# Patient Record
Sex: MALE | Race: WHITE | NOT HISPANIC OR LATINO | URBAN - METROPOLITAN AREA
[De-identification: names, ages, dates, MRNs, and addresses within clinical notes are randomized per-mention and may not be internally consistent; named-entity substitution may affect disease eponyms.]

---

## 2019-03-14 PROBLEM — Z00.00 ENCOUNTER FOR PREVENTIVE HEALTH EXAMINATION: Status: ACTIVE | Noted: 2019-03-14

## 2019-03-18 PROBLEM — F17.210 DEPENDENCE ON NICOTINE FROM CIGARETTES: Status: ACTIVE | Noted: 2019-03-18

## 2019-03-18 PROBLEM — Z87.09 HISTORY OF PULMONARY EMPHYSEMA: Status: RESOLVED | Noted: 2019-03-18 | Resolved: 2019-03-18

## 2019-03-18 PROBLEM — R91.1 LUNG NODULE: Status: ACTIVE | Noted: 2019-03-18

## 2019-03-18 PROBLEM — Z86.39 HISTORY OF HIGH CHOLESTEROL: Status: RESOLVED | Noted: 2019-03-18 | Resolved: 2019-03-18

## 2019-03-18 PROBLEM — F17.200 CURRENT EVERY DAY SMOKER: Status: ACTIVE | Noted: 2019-03-18

## 2019-03-18 RX ORDER — ASPIRIN ENTERIC COATED TABLETS 81 MG 81 MG/1
81 TABLET, DELAYED RELEASE ORAL
Refills: 0 | Status: ACTIVE | COMMUNITY

## 2019-03-18 RX ORDER — ATORVASTATIN CALCIUM 80 MG/1
TABLET, FILM COATED ORAL
Refills: 0 | Status: ACTIVE | COMMUNITY

## 2019-03-22 ENCOUNTER — INPATIENT (INPATIENT)
Facility: HOSPITAL | Age: 64
LOS: 9 days | Discharge: HOME | End: 2019-04-01
Attending: FAMILY MEDICINE | Admitting: FAMILY MEDICINE
Payer: COMMERCIAL

## 2019-03-22 VITALS
HEIGHT: 66 IN | OXYGEN SATURATION: 100 % | TEMPERATURE: 99 F | RESPIRATION RATE: 18 BRPM | HEART RATE: 115 BPM | WEIGHT: 145.06 LBS | DIASTOLIC BLOOD PRESSURE: 100 MMHG | SYSTOLIC BLOOD PRESSURE: 164 MMHG

## 2019-03-22 LAB
ABO RH CONFIRMATION: SIGNIFICANT CHANGE UP
ALBUMIN SERPL ELPH-MCNC: 3.1 G/DL — LOW (ref 3.3–5)
ALP SERPL-CCNC: 78 U/L — SIGNIFICANT CHANGE UP (ref 40–120)
ALT FLD-CCNC: 28 U/L — SIGNIFICANT CHANGE UP (ref 12–78)
ANION GAP SERPL CALC-SCNC: 8 MMOL/L — SIGNIFICANT CHANGE UP (ref 5–17)
APTT BLD: 32.8 SEC — SIGNIFICANT CHANGE UP (ref 27.5–36.3)
AST SERPL-CCNC: 30 U/L — SIGNIFICANT CHANGE UP (ref 15–37)
BASOPHILS # BLD AUTO: 0.04 K/UL — SIGNIFICANT CHANGE UP (ref 0–0.2)
BASOPHILS NFR BLD AUTO: 0.5 % — SIGNIFICANT CHANGE UP (ref 0–2)
BILIRUB SERPL-MCNC: 0.3 MG/DL — SIGNIFICANT CHANGE UP (ref 0.2–1.2)
BLD GP AB SCN SERPL QL: SIGNIFICANT CHANGE UP
BUN SERPL-MCNC: 22 MG/DL — SIGNIFICANT CHANGE UP (ref 7–23)
CALCIUM SERPL-MCNC: 8.4 MG/DL — LOW (ref 8.5–10.1)
CHLORIDE SERPL-SCNC: 104 MMOL/L — SIGNIFICANT CHANGE UP (ref 96–108)
CO2 SERPL-SCNC: 26 MMOL/L — SIGNIFICANT CHANGE UP (ref 22–31)
CREAT SERPL-MCNC: 0.91 MG/DL — SIGNIFICANT CHANGE UP (ref 0.5–1.3)
EOSINOPHIL # BLD AUTO: 0.09 K/UL — SIGNIFICANT CHANGE UP (ref 0–0.5)
EOSINOPHIL NFR BLD AUTO: 1 % — SIGNIFICANT CHANGE UP (ref 0–6)
GLUCOSE SERPL-MCNC: 111 MG/DL — HIGH (ref 70–99)
HCT VFR BLD CALC: 36.1 % — LOW (ref 39–50)
HCT VFR BLD CALC: 36.6 % — LOW (ref 39–50)
HGB BLD-MCNC: 12.2 G/DL — LOW (ref 13–17)
HGB BLD-MCNC: 12.5 G/DL — LOW (ref 13–17)
IMM GRANULOCYTES NFR BLD AUTO: 0.2 % — SIGNIFICANT CHANGE UP (ref 0–1.5)
INR BLD: 1.17 RATIO — HIGH (ref 0.88–1.16)
LYMPHOCYTES # BLD AUTO: 1.25 K/UL — SIGNIFICANT CHANGE UP (ref 1–3.3)
LYMPHOCYTES # BLD AUTO: 14.1 % — SIGNIFICANT CHANGE UP (ref 13–44)
MCHC RBC-ENTMCNC: 30.4 PG — SIGNIFICANT CHANGE UP (ref 27–34)
MCHC RBC-ENTMCNC: 34.2 GM/DL — SIGNIFICANT CHANGE UP (ref 32–36)
MCV RBC AUTO: 89.1 FL — SIGNIFICANT CHANGE UP (ref 80–100)
MONOCYTES # BLD AUTO: 1.13 K/UL — HIGH (ref 0–0.9)
MONOCYTES NFR BLD AUTO: 12.8 % — SIGNIFICANT CHANGE UP (ref 2–14)
NEUTROPHILS # BLD AUTO: 6.31 K/UL — SIGNIFICANT CHANGE UP (ref 1.8–7.4)
NEUTROPHILS NFR BLD AUTO: 71.4 % — SIGNIFICANT CHANGE UP (ref 43–77)
NRBC # BLD: 0 /100 WBCS — SIGNIFICANT CHANGE UP (ref 0–0)
PLATELET # BLD AUTO: 281 K/UL — SIGNIFICANT CHANGE UP (ref 150–400)
POTASSIUM SERPL-MCNC: 4.1 MMOL/L — SIGNIFICANT CHANGE UP (ref 3.5–5.3)
POTASSIUM SERPL-SCNC: 4.1 MMOL/L — SIGNIFICANT CHANGE UP (ref 3.5–5.3)
PROT SERPL-MCNC: 7 GM/DL — SIGNIFICANT CHANGE UP (ref 6–8.3)
PROTHROM AB SERPL-ACNC: 13.1 SEC — HIGH (ref 10–12.9)
RBC # BLD: 4.11 M/UL — LOW (ref 4.2–5.8)
RBC # FLD: 13.2 % — SIGNIFICANT CHANGE UP (ref 10.3–14.5)
SODIUM SERPL-SCNC: 138 MMOL/L — SIGNIFICANT CHANGE UP (ref 135–145)
TYPE + AB SCN PNL BLD: SIGNIFICANT CHANGE UP
WBC # BLD: 8.84 K/UL — SIGNIFICANT CHANGE UP (ref 3.8–10.5)
WBC # FLD AUTO: 8.84 K/UL — SIGNIFICANT CHANGE UP (ref 3.8–10.5)

## 2019-03-22 PROCEDURE — 99285 EMERGENCY DEPT VISIT HI MDM: CPT

## 2019-03-22 PROCEDURE — 71275 CT ANGIOGRAPHY CHEST: CPT | Mod: 26

## 2019-03-22 PROCEDURE — 93010 ELECTROCARDIOGRAM REPORT: CPT

## 2019-03-22 RX ORDER — ATORVASTATIN CALCIUM 80 MG/1
40 TABLET, FILM COATED ORAL AT BEDTIME
Qty: 0 | Refills: 0 | Status: DISCONTINUED | OUTPATIENT
Start: 2019-03-22 | End: 2019-04-01

## 2019-03-22 RX ORDER — ACETAMINOPHEN 500 MG
650 TABLET ORAL EVERY 6 HOURS
Qty: 0 | Refills: 0 | Status: DISCONTINUED | OUTPATIENT
Start: 2019-03-22 | End: 2019-04-01

## 2019-03-22 RX ADMIN — ATORVASTATIN CALCIUM 40 MILLIGRAM(S): 80 TABLET, FILM COATED ORAL at 23:05

## 2019-03-22 NOTE — H&P ADULT - ASSESSMENT
63 yo male presented with coughing up blood for 2 days.    A/P:    1.  Hemoptysis  Weight Loss  Shortness of breath  -will follow Hb/Hct  -follow Pulmonary and Cardiothoracic Surgery consult  -hold aspirin    2.  HLD  -on statin    3.  SCD for DVT ppx    4.  Code status: Full code. 63 yo male presented with coughing up blood for 2 days.    A/P:    1.  Hemoptysis  Weight Loss  Shortness of breath  Right Lung mass  -will follow Hb/Hct  -follow Pulmonary and Cardiothoracic Surgery consult  -hold aspirin    2.  HLD  -on statin    3.  SCD for DVT ppx    4.  Code status: Full code.

## 2019-03-22 NOTE — ED ADULT NURSE NOTE - OBJECTIVE STATEMENT
Pt presents to ED c/o sudden onset of hemoptysis last night and today. Recently diagnosed with a lung mass after imaging by pulmonologist. Around 19:00-20:00 last night, pt reports sudden onset of hemoptysis described as "coughing up red blood." After symptoms resolved, pt felt it unnecessary to seek evaluation. At 14:00 today pt reports return of symptoms. Presents with mother and brother at bedside. No h/o similar symptoms. Does not take blood thinners.  Denies chest pain, SOB

## 2019-03-22 NOTE — H&P ADULT - RS GEN PE MLT RESP DETAILS PC
airway patent/diminished breath sounds, R/respirations non-labored/Coarse breath sound in the right middle lobe area.

## 2019-03-22 NOTE — ED PROVIDER NOTE - PROGRESS NOTE DETAILS
Tino GROVER for ED attending Dr. Rico: Spoke to Dr. Ajay boo for Dr. Scott. Requests pt be seen by Dr. Roldan and she will see the pt.

## 2019-03-22 NOTE — ED PROVIDER NOTE - OBJECTIVE STATEMENT
65 y/o male with a PMHx of HLD on Lipitor, lung mass presents to the ED c/o sudden onset of hemoptysis last night and today. Recently diagnosed with a lung mass after imaging by pulmonologist. Around 19:00-20:00 last night, pt reports sudden onset of hemoptysis described as "coughing up red blood." After symptoms resolved, pt felt it unnecessary to seek evaluation. At 14:00 today pt reports return of symptoms. Presents with mother and brother at bedside. No h/o similar symptoms. Does not take blood thinners. Denies fever, SOB. Smoker. PMD: Julienne Mao. Cardiologist: Kaitlyn Roldan. 63 y/o male with a PMHx of HLD on Lipitor, lung mass presents to the ED c/o sudden onset of hemoptysis last night and today. Recently diagnosed with a lung mass after imaging by pulmonologist. Around 19:00-20:00 last night, pt reports sudden onset of hemoptysis described as "coughing up red blood." After symptoms resolved, pt felt it unnecessary to seek evaluation. At 14:00 today pt reports return of symptoms. Presents with mother and brother at bedside. No h/o similar symptoms. Does not take blood thinners. Denies fever, SOB. Smoker. PMD: Julienne Church. Cardiologist: Kaitlyn Roldan.

## 2019-03-22 NOTE — CONSULT NOTE ADULT - ASSESSMENT
- hemoptysis likely secondary to lung cancer which is likely occluding the right upper lobe bronchus and narrowing of the bronchus intermedius.  - severe copd with symptoms of exacerbation with the bilateral wheeze    - active smoker with the continued smoking and refused to do full pft in the office visit   - H.L     PLAN     - admit to the monitored  unit for close observation   - bleeding likely from the right lung with the tumor .  - steroids for the copd exacerbation   - add nebs and hold ASA   - consult thoracic surgery for emergency operative intervention if needed   - I.R can also consulted  if needed for the embolization   - given the emphysema patient may not surgical candidate would consider radiation and chemotherapy . consult ICU in event massive hemoptysis .  - patient condition was discussed with the mother and brother at bed side

## 2019-03-22 NOTE — H&P ADULT - NEUROLOGICAL DETAILS
responds to pain/normal strength/responds to verbal commands/sensation intact/deep reflexes intact/alert and oriented x 3/cranial nerves intact

## 2019-03-22 NOTE — H&P ADULT - NSHPPHYSICALEXAM_GEN_ALL_CORE
Vital Signs Last 24 Hrs  T(C): 37.2 (22 Mar 2019 18:52), Max: 37.3 (22 Mar 2019 14:26)  T(F): 99 (22 Mar 2019 18:52), Max: 99.1 (22 Mar 2019 14:26)  HR: 95 (22 Mar 2019 18:52) (95 - 100)  BP: 109/74 (22 Mar 2019 18:52) (106/76 - 110/68)  RR: 18 (22 Mar 2019 18:52) (18 - 18)  SpO2: 100% (22 Mar 2019 18:52) (94% - 100%)

## 2019-03-22 NOTE — ED ADULT NURSE REASSESSMENT NOTE - NS ED NURSE REASSESS COMMENT FT1
Pt changing out of dirty clothes and into clean gown and pt socks. Pt updated on plan of care. Normal breathing pattern with no difficulty

## 2019-03-23 DIAGNOSIS — J98.01 ACUTE BRONCHOSPASM: ICD-10-CM

## 2019-03-23 DIAGNOSIS — J44.1 CHRONIC OBSTRUCTIVE PULMONARY DISEASE WITH (ACUTE) EXACERBATION: ICD-10-CM

## 2019-03-23 DIAGNOSIS — R91.8 OTHER NONSPECIFIC ABNORMAL FINDING OF LUNG FIELD: ICD-10-CM

## 2019-03-23 DIAGNOSIS — R04.2 HEMOPTYSIS: ICD-10-CM

## 2019-03-23 LAB
BASOPHILS # BLD AUTO: 0.05 K/UL — SIGNIFICANT CHANGE UP (ref 0–0.2)
BASOPHILS NFR BLD AUTO: 0.6 % — SIGNIFICANT CHANGE UP (ref 0–2)
EOSINOPHIL # BLD AUTO: 0.13 K/UL — SIGNIFICANT CHANGE UP (ref 0–0.5)
EOSINOPHIL NFR BLD AUTO: 1.5 % — SIGNIFICANT CHANGE UP (ref 0–6)
HCT VFR BLD CALC: 36.7 % — LOW (ref 39–50)
HCV AB S/CO SERPL IA: 0.16 S/CO — SIGNIFICANT CHANGE UP (ref 0–0.79)
HCV AB SERPL-IMP: SIGNIFICANT CHANGE UP
HGB BLD-MCNC: 12.3 G/DL — LOW (ref 13–17)
IMM GRANULOCYTES NFR BLD AUTO: 0.1 % — SIGNIFICANT CHANGE UP (ref 0–1.5)
LYMPHOCYTES # BLD AUTO: 1.36 K/UL — SIGNIFICANT CHANGE UP (ref 1–3.3)
LYMPHOCYTES # BLD AUTO: 16 % — SIGNIFICANT CHANGE UP (ref 13–44)
MCHC RBC-ENTMCNC: 29.8 PG — SIGNIFICANT CHANGE UP (ref 27–34)
MCHC RBC-ENTMCNC: 33.5 GM/DL — SIGNIFICANT CHANGE UP (ref 32–36)
MCV RBC AUTO: 88.9 FL — SIGNIFICANT CHANGE UP (ref 80–100)
MONOCYTES # BLD AUTO: 1.08 K/UL — HIGH (ref 0–0.9)
MONOCYTES NFR BLD AUTO: 12.7 % — SIGNIFICANT CHANGE UP (ref 2–14)
NEUTROPHILS # BLD AUTO: 5.89 K/UL — SIGNIFICANT CHANGE UP (ref 1.8–7.4)
NEUTROPHILS NFR BLD AUTO: 69.1 % — SIGNIFICANT CHANGE UP (ref 43–77)
NRBC # BLD: 0 /100 WBCS — SIGNIFICANT CHANGE UP (ref 0–0)
PLATELET # BLD AUTO: 268 K/UL — SIGNIFICANT CHANGE UP (ref 150–400)
RBC # BLD: 4.13 M/UL — LOW (ref 4.2–5.8)
RBC # FLD: 13.3 % — SIGNIFICANT CHANGE UP (ref 10.3–14.5)
WBC # BLD: 8.52 K/UL — SIGNIFICANT CHANGE UP (ref 3.8–10.5)
WBC # FLD AUTO: 8.52 K/UL — SIGNIFICANT CHANGE UP (ref 3.8–10.5)

## 2019-03-23 PROCEDURE — 99255 IP/OBS CONSLTJ NEW/EST HI 80: CPT

## 2019-03-23 RX ORDER — BUDESONIDE, MICRONIZED 100 %
0.5 POWDER (GRAM) MISCELLANEOUS
Qty: 0 | Refills: 0 | Status: DISCONTINUED | OUTPATIENT
Start: 2019-03-23 | End: 2019-04-01

## 2019-03-23 RX ORDER — IPRATROPIUM/ALBUTEROL SULFATE 18-103MCG
3 AEROSOL WITH ADAPTER (GRAM) INHALATION EVERY 6 HOURS
Qty: 0 | Refills: 0 | Status: DISCONTINUED | OUTPATIENT
Start: 2019-03-23 | End: 2019-04-01

## 2019-03-23 RX ADMIN — Medication 0.5 MILLIGRAM(S): at 20:34

## 2019-03-23 RX ADMIN — ATORVASTATIN CALCIUM 40 MILLIGRAM(S): 80 TABLET, FILM COATED ORAL at 21:42

## 2019-03-23 RX ADMIN — Medication 40 MILLIGRAM(S): at 21:42

## 2019-03-23 RX ADMIN — Medication 40 MILLIGRAM(S): at 12:21

## 2019-03-23 RX ADMIN — Medication 3 MILLILITER(S): at 13:56

## 2019-03-23 RX ADMIN — Medication 3 MILLILITER(S): at 20:33

## 2019-03-23 NOTE — PROGRESS NOTE ADULT - ASSESSMENT
- no hemoptysis since last nite  - CT scan reviewed. Has 4cm cavitary lesion encasing RUL bronchus w hilar node- appears to have endobronchial involvement  - plan for bronchoscopy  - clinical Stage IIIA  - will start albuterol/budesonide/iv steroids  - dvt proph

## 2019-03-23 NOTE — DIETITIAN INITIAL EVALUATION ADULT. - PERTINENT LABORATORY DATA
03-22 Na138 mmol/L Glu 111 mg/dL<H> K+ 4.1 mmol/L Cr  0.91 mg/dL BUN 22 mg/dL Phos n/a   Alb 3.1 g/dL<L> PAB n/a

## 2019-03-23 NOTE — PROGRESS NOTE ADULT - SUBJECTIVE AND OBJECTIVE BOX
Subjective:  no hemoptysis today    MEDICATIONS  (STANDING):  atorvastatin 40 milliGRAM(s) Oral at bedtime    MEDICATIONS  (PRN):  acetaminophen   Tablet .. 650 milliGRAM(s) Oral every 6 hours PRN Temp greater or equal to 38C (100.4F), Mild Pain (1 - 3)      Allergies    No Known Allergies    Intolerances        REVIEW OF SYSTEMS:    CONSTITUTIONAL:  As per HPI.  HEENT:  Eyes:  No diplopia or blurred vision. ENT:  No earache, sore throat or runny nose.  CARDIOVASCULAR:  No pressure, squeezing, tightness, heaviness or aching about the chest, neck, axilla or epigastrium.  RESPIRATORY:  No cough, shortness of breath, PND or orthopnea.  GASTROINTESTINAL:  No nausea, vomiting or diarrhea.  GENITOURINARY:  No dysuria, frequency or urgency.  MUSCULOSKELETAL:  no joint pain, deformity, tenderness  EXTREMITIES: no clubbing cyanosis,edema  SKIN:  No change in skin, hair or nails.  NEUROLOGIC:  No paresthesias, fasciculations, seizures or weakness.  PSYCHIATRIC:  No disorder of thought or mood.  ENDOCRINE:  No heat or cold intolerance, polyuria or polydipsia.  HEMATOLOGICAL:  No easy bruising or bleedings:    Vital Signs Last 24 Hrs  T(C): 36.9 (23 Mar 2019 08:55), Max: 37.3 (22 Mar 2019 14:26)  T(F): 98.5 (23 Mar 2019 08:55), Max: 99.2 (22 Mar 2019 23:00)  HR: 79 (23 Mar 2019 08:00) (79 - 100)  BP: 108/61 (23 Mar 2019 08:00) (102/58 - 110/75)  BP(mean): 73 (23 Mar 2019 08:00) (68 - 78)  RR: 19 (23 Mar 2019 08:00) (16 - 21)  SpO2: 97% (23 Mar 2019 08:00) (91% - 100%)    PHYSICAL EXAMINATION:  SKIN: no rashes  HEAD: NC/AT  EYES: PERRLA, EOMI  EARS: TM's intact  NOSE: no abnormalities  NECK:  Supple. No lymphadenopathy. Jugular venous pressure not elevated. Carotids equal.   HEART:   The cardiac impulse has a normal quality. Reg., Nl S1 and S2.  There are no murmurs, rubs or gallops noted  CHEST: bilateral expiratory ronchi  ABDOMEN:  Soft and nontender.   EXTREMITIES:  no C/C/E  NEURO: AAO x 3, no focal deficts       LABS:                        12.3   8.52  )-----------( 268      ( 23 Mar 2019 06:10 )             36.7     03-22    138  |  104  |  22  ----------------------------<  111<H>  4.1   |  26  |  0.91    Ca    8.4<L>      22 Mar 2019 16:21    TPro  7.0  /  Alb  3.1<L>  /  TBili  0.3  /  DBili  x   /  AST  30  /  ALT  28  /  AlkPhos  78  03-22    PT/INR - ( 22 Mar 2019 16:21 )   PT: 13.1 sec;   INR: 1.17 ratio         PTT - ( 22 Mar 2019 16:21 )  PTT:32.8 sec      RADIOLOGY & ADDITIONAL TESTS:

## 2019-03-23 NOTE — PROGRESS NOTE ADULT - SUBJECTIVE AND OBJECTIVE BOX
SUBJECTIVE     patient seen in the step down and has mild episodes of hemoptysis   breathing comfortably and still has wheezing no sob or chest pain or fever      OBJECTIVE   Vital Signs Last 24 Hrs  T(C): 36.9 (23 Mar 2019 08:55), Max: 37.3 (22 Mar 2019 14:26)  T(F): 98.5 (23 Mar 2019 08:55), Max: 99.2 (22 Mar 2019 23:00)  HR: 79 (23 Mar 2019 08:00) (79 - 100)  BP: 108/61 (23 Mar 2019 08:00) (102/58 - 110/75)  BP(mean): 73 (23 Mar 2019 08:00) (68 - 78)  RR: 19 (23 Mar 2019 08:00) (16 - 21)  SpO2: 97% (23 Mar 2019 08:00) (91% - 100%)    Review of systems   as dictated in the history of present illness with the review of other systems non contributory     PHYSICAL EXAM:  Constitutional: , awake and alert, not in distress not on 02 nasal canula   HEENT: Normo cephalic atraumatic  Neck: Soft and supple, No J.V.D   Respiratory: vesicular breathing with the diffuse bilateral wheeze and rhonchi   Cardiovascular: S1 and S2, regular rate .   Gastrointestinal:  soft, nontender,   Extremities: No  edema or calf tenderness .  Neurological: No new  focal deficits.    MEDICATIONS  (STANDING):  ALBUTerol/ipratropium for Nebulization 3 milliLiter(s) Nebulizer every 6 hours  atorvastatin 40 milliGRAM(s) Oral at bedtime  buDESOnide   0.5 milliGRAM(s) Respule 0.5 milliGRAM(s) Inhalation two times a day  methylPREDNISolone sodium succinate Injectable 40 milliGRAM(s) IV Push every 8 hours                            12.3   8.52  )-----------( 268      ( 23 Mar 2019 06:10 )             36.7     03-22    138  |  104  |  22  ----------------------------<  111<H>  4.1   |  26  |  0.91    Ca    8.4<L>      22 Mar 2019 16:21    TPro  7.0  /  Alb  3.1<L>  /  TBili  0.3  /  DBili  x   /  AST  30  /  ALT  28  /  AlkPhos  78  03-22         < from: CT Angio Chest PE Protocol w/ IV Cont (03.22.19 @ 17:53) >  COMPARISON: None.    PULMONARY ARTERIES: No pulmonary embolism. Right upper lobe pulmonary   artery and interlobar artery are encased and narrowed by tumor.    LUNGS, AIRWAYS: Saber-sheath configuration of the trachea consistent with   COPD. Centrilobular emphysema. 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 Distal tree-in-bud   opacities in the right upper lobe.    PLEURA: No pleural abnormality.    VESSELS: Normal caliber aorta.    HEART: Normal heart size. No pericardial effusion. Coronary artery   calcifications are present.    MEDIASTINUM, ABDOULAYE, AXILLAE: Subcarinal and right hilar soft tissue   invasion.    UPPER ABDOMEN: Limited visualization is unremarkable.    BONES AND CHEST WALL: No aggressive lesion.    IMPRESSION:     No pulmonary embolism.    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 Right upper lobe pulmonary artery and interlobar artery are   encased and narrowed by tumor. Subcarinal and right hilar soft tissue   invasion.          < end of copied text >

## 2019-03-23 NOTE — DIETITIAN INITIAL EVALUATION ADULT. - PHYSICAL APPEARANCE
emaciated/Pt with clear signs of muscle and fat wasting; NFPE revealed severe muscle wasting in temple, clavicle, deltoid, interosseous. Severe fat wasting: triceps, ribs

## 2019-03-23 NOTE — DIETITIAN INITIAL EVALUATION ADULT. - OTHER INFO
Pt seen for assessment. of HLD on Lipitor, lung mass. Pt reports his appetite has changed over the past 5-6 months. Pt reports he is not eating well and has changed. Pt denies chewing and swallowing difficulty. Pt denies n/v/d/c. Pt states he is eating less fruits and vegetables. Pt with recent d/x of lung Ca. Pt juvencio 16, stage II coccyx, no edema. NFPE noted above. Pt meets criteria for severe protein-calorie malnutrition in the context of chronic illness. Ensure enlive BID, encourage PO intake, monitor pt's nutritional status.

## 2019-03-23 NOTE — DIETITIAN INITIAL EVALUATION ADULT. - PERTINENT MEDS FT
MEDICATIONS  (STANDING):  ALBUTerol/ipratropium for Nebulization 3 milliLiter(s) Nebulizer every 6 hours  atorvastatin 40 milliGRAM(s) Oral at bedtime  buDESOnide   0.5 milliGRAM(s) Respule 0.5 milliGRAM(s) Inhalation two times a day  methylPREDNISolone sodium succinate Injectable 40 milliGRAM(s) IV Push every 8 hours    MEDICATIONS  (PRN):  acetaminophen   Tablet .. 650 milliGRAM(s) Oral every 6 hours PRN Temp greater or equal to 38C (100.4F), Mild Pain (1 - 3)

## 2019-03-23 NOTE — PROGRESS NOTE ADULT - ASSESSMENT
- hemoptysis likely secondary to lung cancer which is likely, occluding the right upper lobe bronchus and narrowing of the bronchus intermedius.  - severe copd with symptoms of exacerbation with the bilateral wheeze    - active smoker with the continued smoking and refused to do full pft in the office visit   - H.L     PLAN       - steroids for the copd exacerbation   - continue with the nebs and inhaled steroids   - consult thoracic surgery for emergency operative intervention if needed   - I.R can also consulted  if needed for the embolization   - given the emphysema patient may not surgical candidate would consider radiation and chemotherapy . consult ICU in event massive hemoptysis .  - will schedule for the bronchoscopy as the wheezing is improving and would repeat cxr to rule out any obstructive pneumonitis.

## 2019-03-24 PROCEDURE — 99232 SBSQ HOSP IP/OBS MODERATE 35: CPT

## 2019-03-24 RX ADMIN — Medication 3 MILLILITER(S): at 07:56

## 2019-03-24 RX ADMIN — Medication 0.5 MILLIGRAM(S): at 20:34

## 2019-03-24 RX ADMIN — Medication 0.5 MILLIGRAM(S): at 07:56

## 2019-03-24 RX ADMIN — Medication 3 MILLILITER(S): at 20:31

## 2019-03-24 RX ADMIN — Medication 3 MILLILITER(S): at 01:52

## 2019-03-24 RX ADMIN — Medication 40 MILLIGRAM(S): at 15:30

## 2019-03-24 RX ADMIN — ATORVASTATIN CALCIUM 40 MILLIGRAM(S): 80 TABLET, FILM COATED ORAL at 21:57

## 2019-03-24 RX ADMIN — Medication 3 MILLILITER(S): at 13:05

## 2019-03-24 RX ADMIN — Medication 40 MILLIGRAM(S): at 05:30

## 2019-03-24 RX ADMIN — Medication 40 MILLIGRAM(S): at 21:57

## 2019-03-24 NOTE — PROGRESS NOTE ADULT - ASSESSMENT
- more hemoptysis  - CT scan reviewed. Has 4cm cavitary lesion encasing RUL bronchus w hilar node- appears to have endobronchial involvement  - plan for bronchoscopy  - clinical Stage IIIA  - will start albuterol/budesonide/iv steroids  - dvt proph

## 2019-03-24 NOTE — PROGRESS NOTE ADULT - SUBJECTIVE AND OBJECTIVE BOX
Subjective:  still some hemoptysis but less    MEDICATIONS  (STANDING):  ALBUTerol/ipratropium for Nebulization 3 milliLiter(s) Nebulizer every 6 hours  atorvastatin 40 milliGRAM(s) Oral at bedtime  buDESOnide   0.5 milliGRAM(s) Respule 0.5 milliGRAM(s) Inhalation two times a day  methylPREDNISolone sodium succinate Injectable 40 milliGRAM(s) IV Push every 8 hours    MEDICATIONS  (PRN):  acetaminophen   Tablet .. 650 milliGRAM(s) Oral every 6 hours PRN Temp greater or equal to 38C (100.4F), Mild Pain (1 - 3)      Allergies    No Known Allergies    Intolerances        REVIEW OF SYSTEMS:    CONSTITUTIONAL:  As per HPI.  HEENT:  Eyes:  No diplopia or blurred vision. ENT:  No earache, sore throat or runny nose.  CARDIOVASCULAR:  No pressure, squeezing, tightness, heaviness or aching about the chest, neck, axilla or epigastrium.  RESPIRATORY:  No cough, shortness of breath, PND or orthopnea.  GASTROINTESTINAL:  No nausea, vomiting or diarrhea.  GENITOURINARY:  No dysuria, frequency or urgency.  MUSCULOSKELETAL:  no joint pain, deformity, tenderness  EXTREMITIES: no clubbing cyanosis,edema  SKIN:  No change in skin, hair or nails.  NEUROLOGIC:  No paresthesias, fasciculations, seizures or weakness.  PSYCHIATRIC:  No disorder of thought or mood.  ENDOCRINE:  No heat or cold intolerance, polyuria or polydipsia.  HEMATOLOGICAL:  No easy bruising or bleedings:    Vital Signs Last 24 Hrs  T(C): 36.7 (24 Mar 2019 08:40), Max: 37.4 (23 Mar 2019 17:24)  T(F): 98.1 (24 Mar 2019 08:40), Max: 99.4 (23 Mar 2019 17:24)  HR: 86 (24 Mar 2019 08:02) (71 - 109)  BP: 97/63 (24 Mar 2019 08:02) (97/63 - 163/67)  BP(mean): 67 (24 Mar 2019 08:02) (67 - 109)  RR: 22 (24 Mar 2019 08:02) (15 - 25)  SpO2: 98% (24 Mar 2019 08:02) (91% - 100%)    PHYSICAL EXAMINATION:  SKIN: no rashes  HEAD: NC/AT  EYES: PERRLA, EOMI  EARS: TM's intact  NOSE: no abnormalities  NECK:  Supple. No lymphadenopathy. Jugular venous pressure not elevated. Carotids equal.   HEART:   The cardiac impulse has a normal quality. Reg., Nl S1 and S2.  There are no murmurs, rubs or gallops noted  CHEST: coarse ronchi right   ABDOMEN:  Soft and nontender.   EXTREMITIES:  no C/C/E  NEURO: AAO x 3, no focal deficts       LABS:                        12.3   8.52  )-----------( 268      ( 23 Mar 2019 06:10 )             36.7     03-22    138  |  104  |  22  ----------------------------<  111<H>  4.1   |  26  |  0.91    Ca    8.4<L>      22 Mar 2019 16:21    TPro  7.0  /  Alb  3.1<L>  /  TBili  0.3  /  DBili  x   /  AST  30  /  ALT  28  /  AlkPhos  78  03-22    PT/INR - ( 22 Mar 2019 16:21 )   PT: 13.1 sec;   INR: 1.17 ratio         PTT - ( 22 Mar 2019 16:21 )  PTT:32.8 sec      RADIOLOGY & ADDITIONAL TESTS:

## 2019-03-24 NOTE — PROGRESS NOTE ADULT - ASSESSMENT
- hemoptysis likely secondary to lung cancer which is likely, occluding the right upper lobe bronchus with the endobronchial lesion  and narrowing of the bronchus intermedius.  - severe copd with symptoms of exacerbation with the bilateral wheeze which is improving   - active smoker with the continued smoking and refused to do full pft in the office visit   - H.L     PLAN       - continue with the nebs and inhaled steroids along with the solumederol and reduce the dose to twice daily   - thoracic surgery follow up  with Dr maldonado for the broncho and biopsy and if bleeds for cauterization tomorrow   - I.R can also consulted  if needed for the embolization for emergency if needed how ever hemoptysis is being slowing down   - given the emphysema patient may not surgical candidate would consider radiation and chemotherapy  how would need full pft before the final decision   - patient will follow up with Dr alcala from tomorrow

## 2019-03-24 NOTE — PROGRESS NOTE ADULT - SUBJECTIVE AND OBJECTIVE BOX
SUBJECTIVE       Patient has mild hemoptysis not significant amount   feels his breathing and sob is improved . no 02 needed now . no fever spikes     OBJECTIVE   Vital Signs Last 24 Hrs  T(C): 36.7 (24 Mar 2019 12:22), Max: 37.4 (23 Mar 2019 17:24)  T(F): 98 (24 Mar 2019 12:22), Max: 99.4 (23 Mar 2019 17:24)  HR: 99 (24 Mar 2019 10:00) (71 - 109)  BP: 113/64 (24 Mar 2019 10:00) (97/63 - 163/67)  BP(mean): 76 (24 Mar 2019 10:00) (67 - 109)  RR: 18 (24 Mar 2019 10:00) (15 - 25)  SpO2: 92% (24 Mar 2019 10:00) (91% - 100%)    Review of systems   as dictated in the history of present illness with the review of other systems non contributory     PHYSICAL EXAM:  Constitutional: , awake and alert, not in distress.  HEENT: Normo cephalic atraumatic  Neck: Soft and supple, No J.V.D   Respiratory: vesicular breathing has bilateral wheeze which is improved and patient able to take deep breaths now   Cardiovascular: S1 and S2, regular rate .   Gastrointestinal:  soft, nontender,   Extremities: No  edema or calf tenderness .  Neurological: No new  focal deficits.    MEDICATIONS  (STANDING):  ALBUTerol/ipratropium for Nebulization 3 milliLiter(s) Nebulizer every 6 hours  atorvastatin 40 milliGRAM(s) Oral at bedtime  buDESOnide   0.5 milliGRAM(s) Respule 0.5 milliGRAM(s) Inhalation two times a day  methylPREDNISolone sodium succinate Injectable 40 milliGRAM(s) IV Push every 8 hours                            12.3   8.52  )-----------( 268      ( 23 Mar 2019 06:10 )             36.7     03-22    138  |  104  |  22  ----------------------------<  111<H>  4.1   |  26  |  0.91    Ca    8.4<L>      22 Mar 2019 16:21    TPro  7.0  /  Alb  3.1<L>  /  TBili  0.3  /  DBili  x   /  AST  30  /  ALT  28  /  AlkPhos  78  03-22

## 2019-03-25 ENCOUNTER — APPOINTMENT (OUTPATIENT)
Dept: THORACIC SURGERY | Facility: CLINIC | Age: 64
End: 2019-03-25

## 2019-03-25 DIAGNOSIS — R91.1 SOLITARY PULMONARY NODULE: ICD-10-CM

## 2019-03-25 DIAGNOSIS — Z87.09 PERSONAL HISTORY OF OTHER DISEASES OF THE RESPIRATORY SYSTEM: ICD-10-CM

## 2019-03-25 DIAGNOSIS — F17.200 NICOTINE DEPENDENCE, UNSPECIFIED, UNCOMPLICATED: ICD-10-CM

## 2019-03-25 DIAGNOSIS — F17.210 NICOTINE DEPENDENCE, CIGARETTES, UNCOMPLICATED: ICD-10-CM

## 2019-03-25 DIAGNOSIS — Z86.39 PERSONAL HISTORY OF OTHER ENDOCRINE, NUTRITIONAL AND METABOLIC DISEASE: ICD-10-CM

## 2019-03-25 PROCEDURE — 99232 SBSQ HOSP IP/OBS MODERATE 35: CPT

## 2019-03-25 RX ORDER — LANOLIN ALCOHOL/MO/W.PET/CERES
5 CREAM (GRAM) TOPICAL ONCE
Qty: 0 | Refills: 0 | Status: COMPLETED | OUTPATIENT
Start: 2019-03-25 | End: 2019-03-25

## 2019-03-25 RX ADMIN — Medication 40 MILLIGRAM(S): at 05:51

## 2019-03-25 RX ADMIN — Medication 3 MILLILITER(S): at 20:17

## 2019-03-25 RX ADMIN — Medication 40 MILLIGRAM(S): at 14:04

## 2019-03-25 RX ADMIN — Medication 0.5 MILLIGRAM(S): at 20:17

## 2019-03-25 RX ADMIN — Medication 3 MILLILITER(S): at 09:13

## 2019-03-25 RX ADMIN — ATORVASTATIN CALCIUM 40 MILLIGRAM(S): 80 TABLET, FILM COATED ORAL at 21:45

## 2019-03-25 RX ADMIN — Medication 40 MILLIGRAM(S): at 21:45

## 2019-03-25 RX ADMIN — Medication 3 MILLILITER(S): at 13:49

## 2019-03-25 RX ADMIN — Medication 0.5 MILLIGRAM(S): at 09:13

## 2019-03-25 RX ADMIN — Medication 650 MILLIGRAM(S): at 01:03

## 2019-03-25 RX ADMIN — Medication 5 MILLIGRAM(S): at 22:46

## 2019-03-25 RX ADMIN — Medication 3 MILLILITER(S): at 01:56

## 2019-03-25 RX ADMIN — Medication 650 MILLIGRAM(S): at 01:33

## 2019-03-25 NOTE — PROGRESS NOTE ADULT - SUBJECTIVE AND OBJECTIVE BOX
HOSPITALIST ATTENDING PROGRESS NOTE    Chart and meds reviewed.  Patient seen and examined.    S: 3/25/19 pt seen and examined, says has seen  as outpatietn, was told may have malignancy, quit smoking 2 weeks ago, 44 years x 2 packs per day, less hemoptysis today. Lives with mom.     All 10 systems reviewed and found to be negative with the exception of what has been described above.    MEDICATIONS  (STANDING):  ALBUTerol/ipratropium for Nebulization 3 milliLiter(s) Nebulizer every 6 hours  atorvastatin 40 milliGRAM(s) Oral at bedtime  buDESOnide   0.5 milliGRAM(s) Respule 0.5 milliGRAM(s) Inhalation two times a day  methylPREDNISolone sodium succinate Injectable 40 milliGRAM(s) IV Push every 8 hours    MEDICATIONS  (PRN):  acetaminophen   Tablet .. 650 milliGRAM(s) Oral every 6 hours PRN Temp greater or equal to 38C (100.4F), Mild Pain (1 - 3)      VITALS:  T(F): 98.2 (03-25-19 @ 11:51), Max: 98.3 (03-24-19 @ 18:37)  HR: 103 (03-25-19 @ 11:51) (80 - 103)  BP: 105/59 (03-25-19 @ 11:51) (100/65 - 115/63)  RR: 16 (03-25-19 @ 11:51) (16 - 22)  SpO2: 93% (03-25-19 @ 11:51) (92% - 95%)  Wt(kg): --    I&O's Summary    24 Mar 2019 07:01  -  25 Mar 2019 07:00  --------------------------------------------------------  IN: 0 mL / OUT: 900 mL / NET: -900 mL        CAPILLARY BLOOD GLUCOSE          PHYSICAL EXAM:    HEENT:  pupils equal and reactive, EOMI, no oropharyngeal lesions, erythema, exudates, oral thrush  NECK:   supple, no carotid bruits, no palpable lymph nodes, no thyromegaly  CV:  +S1, +S2, regular, no murmurs or rubs  RESP:   lungs clear to auscultation bilaterally, no wheezing, rales, rhonchi, good air entry bilaterally  BREAST:  not examined  GI:  abdomen soft, non-tender, non-distended, normal BS, no bruits, no abdominal masses, no palpable masses  RECTAL:  not examined  :  not examined  MSK:   normal muscle tone, no atrophy, no rigidity, no contractions  EXT:  no clubbing, no cyanosis, no edema, no calf pain, swelling or erythema  VASCULAR:  pulses equal and symmetric in the upper and lower extremities  NEURO:  AAOX3, no focal neurological deficits, follows all commands, able to move extremities spontaneously  SKIN:  no ulcers, lesions or rashes    LABS:                                                      CULTURES:

## 2019-03-25 NOTE — PROGRESS NOTE ADULT - SUBJECTIVE AND OBJECTIVE BOX
Subjective:  Had a slight episode of hemoptysis but this is improving  Was NPO this afternoon for bronchoscopy later tonight however ate so procedure canceled    Review of Systems:  All 10 systems reviewed in detailed and found to be negative with the exception of what has already been described above    Allergies:  No Known Allergies    Meds  MEDICATIONS  (STANDING):  ALBUTerol/ipratropium for Nebulization 3 milliLiter(s) Nebulizer every 6 hours  atorvastatin 40 milliGRAM(s) Oral at bedtime  buDESOnide   0.5 milliGRAM(s) Respule 0.5 milliGRAM(s) Inhalation two times a day  methylPREDNISolone sodium succinate Injectable 40 milliGRAM(s) IV Push every 8 hours    MEDICATIONS  (PRN):  acetaminophen   Tablet .. 650 milliGRAM(s) Oral every 6 hours PRN Temp greater or equal to 38C (100.4F), Mild Pain (1 - 3)    Physical Exam  T(C): 37.1 (03-25-19 @ 20:12), Max: 37.1 (03-25-19 @ 20:12)  HR: 90 (03-25-19 @ 20:17) (78 - 103)  BP: 108/59 (03-25-19 @ 20:12) (105/59 - 115/63)  RR: 18 (03-25-19 @ 20:12) (16 - 19)  SpO2: 94% (03-25-19 @ 20:12) (93% - 95%)  Gen: Alert, oriented, no distress, cachectic  HEENT: Anicteric sclera, moist mucous membranes, no JVD, no lymphadenopathy, good dentition  Cardio: Regular rhythm and rate, normal S1S2, no murmurs  Resp: Clear to auscultation bilaterally, no wheezing or rhonchi  GI: Nontender, nondistended, normoactive bowel sounds  Ext: No cyanosis, clubbing or edema  Neuro: Nonfocal    Labs:    < from: CT Angio Chest PE Protocol w/ IV Cont (03.22.19 @ 17:53) >  XAM:  CTA CHEST PE PROTOCOL (W)AW IC                            PROCEDURE DATE:  03/22/2019          INTERPRETATION:  CTA CHEST PE PROTOCOL (W)AW IC    HISTORY:  Hemoptysis    TECHNIQUE: Contrast enhanced CT pulmonary angiogram was performed.   Multiplanar CT and HRCT images were reviewed. Maximum intensity   projection (MIP) images are reconstructed as per CT angiography protocol.   Images were acquired during the administration of 90 cc Omnipaque 350 IV   contrast. This study was performed using automatic exposure control   (radiation dose reduction software) to obtain a diagnostic image quality   scan with patient dose as low as reasonably achievable.    COMPARISON: None.    PULMONARY ARTERIES: No pulmonary embolism. Right upper lobe pulmonary   artery and interlobar artery are encased and narrowed by tumor.    LUNGS, AIRWAYS: Saber-sheath configuration of the trachea consistent with   COPD. Centrilobular emphysema. 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 Distal tree-in-bud   opacities in the right upper lobe.    PLEURA: No pleural abnormality.    VESSELS: Normal caliber aorta.    HEART: Normal heart size. No pericardial effusion. Coronary artery   calcifications are present.    MEDIASTINUM, ABDOULAYE, AXILLAE: Subcarinal and right hilar soft tissue   invasion.    UPPER ABDOMEN: Limited visualization is unremarkable.    BONES AND CHEST WALL: No aggressive lesion.    IMPRESSION:     No pulmonary embolism.    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 Right upper lobe pulmonary artery and interlobar artery are   encased and narrowed by tumor. Subcarinal and right hilar soft tissue   invasion.      < end of copied text >

## 2019-03-25 NOTE — PROGRESS NOTE ADULT - SUBJECTIVE AND OBJECTIVE BOX
Subjective:63 yo male with a PMHx of HLD on Lipitor, lung mass severe copd  presents to the ED with sudden onset of coughing up blood since last night and today. patient was followed up with Dr alcala who suggested out patient follow up with DR amldonado for the biopsy and patient could not make his visit in the next few days . He has to come to the ER with the hemoptysis that started  Around 19:00-20:00 last night, patient reports sudden onset of  "coughing up red blood." After symptoms resolved, patient felt it was unnecessary to seek evaluation. At 14:00 today patient reports return of symptoms. Presents with mother and brother at bedside. No h/o similar symptoms. patient takes ASA on daily basis.  Denies fever. No sick contact. No recent travel. He is also having SOB for the last 2 weeks during his work. Patient has lost significant weight in the 4-5 months as per the brother at the bedside. As per the mother at the bedside he has lost his appetite around 6 months ago. He is a current smoker. Started to smoke when he turned 18 years. He used to smoke 1/2 pack-1 pack a day over all these years. He has out patient pet positive scan with  no uptake in the mediastinal lymph nodes . He also refused to do the pft he did only  spirometry shows  moderate obstruction and how ever has diffuse emphysema and may be having low diffusion . He noted to have diffuse wheezing with the hemoptysis indicative copd exacerbation as well .   3/25 Minimal hemoptysis since event, Pt still coughing, No complaint/ovenight events    Vital Signs:  Vital Signs Last 24 Hrs  T(C): 36.5 (03-25-19 @ 04:43), Max: 36.8 (03-24-19 @ 18:37)  T(F): 97.7 (03-25-19 @ 04:43), Max: 98.3 (03-24-19 @ 18:37)  HR: 80 (03-25-19 @ 04:43) (80 - 103)  BP: 115/63 (03-25-19 @ 04:43) (100/65 - 115/63)  RR: 19 (03-25-19 @ 04:43) (19 - 23)  SpO2: 95% (03-25-19 @ 04:43) (92% - 96%) on (O2)    Telemetry/Alarms:    Relevant labs, radiology and Medications reviewed            MEDICATIONS  (STANDING):  ALBUTerol/ipratropium for Nebulization 3 milliLiter(s) Nebulizer every 6 hours  atorvastatin 40 milliGRAM(s) Oral at bedtime  buDESOnide   0.5 milliGRAM(s) Respule 0.5 milliGRAM(s) Inhalation two times a day  methylPREDNISolone sodium succinate Injectable 40 milliGRAM(s) IV Push every 8 hours    MEDICATIONS  (PRN):  acetaminophen   Tablet .. 650 milliGRAM(s) Oral every 6 hours PRN Temp greater or equal to 38C (100.4F), Mild Pain (1 - 3)      Physical exam  General: WN/WD NAD  Neurology: Awake, nonfocal, MCGUIRE x 4  Eyes: Scleras clear, PERRLA/ EOMI, Gross vision intact  ENT:Gross hearing intact, grossly patent pharynx, no stridor  Neck: Neck supple, trachea midline, No JVD,   Respiratory: CTA B/L, No wheezing, rales, rhonchi  CV: RRR, S1S2, no murmurs, rubs or gallops  Abdominal: Soft, NT, ND +BS,   Extremities: No edema, + peripheral pulses  Skin: No Rashes, Hematoma, Ecchymosis  Lymphatic: No Neck, axilla, groin LAD  Psych: Oriented x 3, normal affect      I&O's Summary    24 Mar 2019 07:01  -  25 Mar 2019 07:00  --------------------------------------------------------  IN: 0 mL / OUT: 900 mL / NET: -900 mL        Assessment  64y Male  w/ PAST MEDICAL & SURGICAL HISTORY:  admitted with complaints of Patient is a 64y old  Male who presents with a chief complaint of complain of coughing up blood (24 Mar 2019 12:45)  .  On (Date), patient underwent . Postoperative course/issues:  64M pmhx HLD, COPD, hilar mass, current smoker with recent episode of hemoptysis    PLAN  Bronchscopy with Pulmonary  If pt rebleeds would recommend IR emolization  Pt would be at high risk for pulmonary resection with active hemoptysis  Discussed with Cardiothoracic Team at AM rounds.

## 2019-03-25 NOTE — PROGRESS NOTE ADULT - ASSESSMENT
- hemoptysis likely secondary to lung cancer  - severe copd with exacerbation  - active smoker   - HLD    PLAN     - continue nebs q6, methylpred q8  - thoracic following  - discussed with thoracic, bronchoscopy will need for localization of lesion in order for cauterization  - IR also consulted  - Will call OR tomorrow to see when we can perform bronchoscopy, NPO status based on when he will be able to have the procedure  - Will also perform BAL and possible brushing during procedure  - Spoke to Heme onc, still would need tissue biopsy in the long run to decide on treatment

## 2019-03-25 NOTE — PROGRESS NOTE ADULT - ASSESSMENT
# Hemoptysis with recent Dx of Lung Mass and possible Lung CA  # COPD exacerbation  # DVT ppx    - CT scan reviewed. Has 4cm cavitary lesion encasing RUL bronchus w hilar node  - d/w  and to decide on bronchoscopy  - Pulm and cardio/thoracic Sx eval appreciated.   - continue albuterol/budesonide/iv steroids

## 2019-03-26 LAB
ANION GAP SERPL CALC-SCNC: 7 MMOL/L — SIGNIFICANT CHANGE UP (ref 5–17)
APPEARANCE UR: CLEAR — SIGNIFICANT CHANGE UP
BACTERIA # UR AUTO: ABNORMAL
BILIRUB UR-MCNC: NEGATIVE — SIGNIFICANT CHANGE UP
BUN SERPL-MCNC: 21 MG/DL — SIGNIFICANT CHANGE UP (ref 7–23)
CALCIUM SERPL-MCNC: 8.5 MG/DL — SIGNIFICANT CHANGE UP (ref 8.5–10.1)
CHLORIDE SERPL-SCNC: 101 MMOL/L — SIGNIFICANT CHANGE UP (ref 96–108)
CO2 SERPL-SCNC: 27 MMOL/L — SIGNIFICANT CHANGE UP (ref 22–31)
COLOR SPEC: YELLOW — SIGNIFICANT CHANGE UP
CREAT SERPL-MCNC: 0.82 MG/DL — SIGNIFICANT CHANGE UP (ref 0.5–1.3)
DIFF PNL FLD: ABNORMAL
EPI CELLS # UR: SIGNIFICANT CHANGE UP
GLUCOSE BLDC GLUCOMTR-MCNC: 154 MG/DL — HIGH (ref 70–99)
GLUCOSE SERPL-MCNC: 117 MG/DL — HIGH (ref 70–99)
GLUCOSE UR QL: NEGATIVE MG/DL — SIGNIFICANT CHANGE UP
HCT VFR BLD CALC: 32.5 % — LOW (ref 39–50)
HGB BLD-MCNC: 11.4 G/DL — LOW (ref 13–17)
KETONES UR-MCNC: ABNORMAL
LEUKOCYTE ESTERASE UR-ACNC: ABNORMAL
MCHC RBC-ENTMCNC: 30.6 PG — SIGNIFICANT CHANGE UP (ref 27–34)
MCHC RBC-ENTMCNC: 35.1 GM/DL — SIGNIFICANT CHANGE UP (ref 32–36)
MCV RBC AUTO: 87.4 FL — SIGNIFICANT CHANGE UP (ref 80–100)
NITRITE UR-MCNC: NEGATIVE — SIGNIFICANT CHANGE UP
NRBC # BLD: 0 /100 WBCS — SIGNIFICANT CHANGE UP (ref 0–0)
PH UR: 6.5 — SIGNIFICANT CHANGE UP (ref 5–8)
PLATELET # BLD AUTO: 354 K/UL — SIGNIFICANT CHANGE UP (ref 150–400)
POTASSIUM SERPL-MCNC: 4.4 MMOL/L — SIGNIFICANT CHANGE UP (ref 3.5–5.3)
POTASSIUM SERPL-SCNC: 4.4 MMOL/L — SIGNIFICANT CHANGE UP (ref 3.5–5.3)
PROT UR-MCNC: NEGATIVE MG/DL — SIGNIFICANT CHANGE UP
RBC # BLD: 3.72 M/UL — LOW (ref 4.2–5.8)
RBC # FLD: 13.1 % — SIGNIFICANT CHANGE UP (ref 10.3–14.5)
RBC CASTS # UR COMP ASSIST: ABNORMAL /HPF (ref 0–4)
SODIUM SERPL-SCNC: 135 MMOL/L — SIGNIFICANT CHANGE UP (ref 135–145)
SP GR SPEC: 1.01 — SIGNIFICANT CHANGE UP (ref 1.01–1.02)
UROBILINOGEN FLD QL: NEGATIVE MG/DL — SIGNIFICANT CHANGE UP
WBC # BLD: 16.09 K/UL — HIGH (ref 3.8–10.5)
WBC # FLD AUTO: 16.09 K/UL — HIGH (ref 3.8–10.5)
WBC UR QL: SIGNIFICANT CHANGE UP

## 2019-03-26 PROCEDURE — 99232 SBSQ HOSP IP/OBS MODERATE 35: CPT

## 2019-03-26 RX ADMIN — Medication 40 MILLIGRAM(S): at 05:42

## 2019-03-26 RX ADMIN — Medication 3 MILLILITER(S): at 20:47

## 2019-03-26 RX ADMIN — Medication 0.5 MILLIGRAM(S): at 20:47

## 2019-03-26 RX ADMIN — Medication 0.5 MILLIGRAM(S): at 07:42

## 2019-03-26 RX ADMIN — Medication 40 MILLIGRAM(S): at 14:50

## 2019-03-26 RX ADMIN — Medication 3 MILLILITER(S): at 13:32

## 2019-03-26 RX ADMIN — Medication 40 MILLIGRAM(S): at 21:16

## 2019-03-26 RX ADMIN — Medication 3 MILLILITER(S): at 07:40

## 2019-03-26 RX ADMIN — ATORVASTATIN CALCIUM 40 MILLIGRAM(S): 80 TABLET, FILM COATED ORAL at 21:16

## 2019-03-26 NOTE — PROGRESS NOTE ADULT - ASSESSMENT
- hemoptysis likely secondary to lung cancer  - severe copd with exacerbation  - active smoker   - HLD    PLAN     - continue nebs q6, methylpred q8  - thoracic following  - discussed with thoracic, bronchoscopy will need for localization of lesion in order for cauterization  - IR also consulted  - Will call OR tomorrow to see when we can perform bronchoscopy, NPO status based on when he will be able to have the procedure  - Will also perform BAL and possible brushing during procedure  - Spoke to Heme onc, still would need tissue biopsy in the long run to decide on treatment - hemoptysis likely secondary to lung cancer  - severe copd with exacerbation  - active smoker   - HLD    PLAN     - continue nebs q6, methylpred q8  - can titrate to methylpred q12 tomorrow  - thoracic following  - discussed with thoracic, bronchoscopy will need for localization of lesion in order for cauterization  - IR also consulted  - Will call OR today to see when we can perform bronchoscopy likely this afternoon or tomorrow morning  - NPO status, 8 hours prior to the procedure  - I impressed upon him and his brother the importance of remaining NPO when we are obtaining a time for the procedure. He has declined PFTs in the past as well, spoke to brother about this, states that he will be the one that helps navigate the patient through the process and that he will make sure that the patient complies with the necessary work up  - Will also perform BAL and possible brushing during procedure  - Spoke to Heme onc, still would need tissue biopsy in the long run to decide on treatment

## 2019-03-26 NOTE — PROGRESS NOTE ADULT - SUBJECTIVE AND OBJECTIVE BOX
HOSPITALIST ATTENDING PROGRESS NOTE    Chart and meds reviewed.  Patient seen and examined.    S: 3/25/19 pt seen and examined, says has seen  as outpatietn, was told may have malignancy, quit smoking 2 weeks ago, 44 years x 2 packs per day, less hemoptysis today. Lives with mom.     3/26/19 pt seen and examined, discussed with brother and mom    All 10 systems reviewed and found to be negative with the exception of what has been described above.    MEDICATIONS  (STANDING):  ALBUTerol/ipratropium for Nebulization 3 milliLiter(s) Nebulizer every 6 hours  atorvastatin 40 milliGRAM(s) Oral at bedtime  buDESOnide   0.5 milliGRAM(s) Respule 0.5 milliGRAM(s) Inhalation two times a day  methylPREDNISolone sodium succinate Injectable 40 milliGRAM(s) IV Push every 8 hours    MEDICATIONS  (PRN):  acetaminophen   Tablet .. 650 milliGRAM(s) Oral every 6 hours PRN Temp greater or equal to 38C (100.4F), Mild Pain (1 - 3)      VITALS:  T(F): 98.9 (03-26-19 @ 13:15), Max: 98.9 (03-26-19 @ 13:15)  HR: 82 (03-26-19 @ 13:30) (79 - 92)  BP: 115/69 (03-26-19 @ 13:15) (98/52 - 115/69)  RR: 16 (03-26-19 @ 13:15) (16 - 18)  SpO2: 96% (03-26-19 @ 13:15) (94% - 96%)  Wt(kg): --    I&O's Summary    25 Mar 2019 07:01  -  26 Mar 2019 07:00  --------------------------------------------------------  IN: 0 mL / OUT: 725 mL / NET: -725 mL        CAPILLARY BLOOD GLUCOSE          PHYSICAL EXAM:    HEENT:  pupils equal and reactive, EOMI, no oropharyngeal lesions, erythema, exudates, oral thrush  NECK:   supple, no carotid bruits, no palpable lymph nodes, no thyromegaly  CV:  +S1, +S2, regular, no murmurs or rubs  RESP:   lungs clear to auscultation bilaterally, no wheezing, rales, rhonchi, good air entry bilaterally  BREAST:  not examined  GI:  abdomen soft, non-tender, non-distended, normal BS, no bruits, no abdominal masses, no palpable masses  RECTAL:  not examined  :  not examined  MSK:   normal muscle tone, no atrophy, no rigidity, no contractions  EXT:  no clubbing, no cyanosis, no edema, no calf pain, swelling or erythema  VASCULAR:  pulses equal and symmetric in the upper and lower extremities  NEURO:  AAOX3, no focal neurological deficits, follows all commands, able to move extremities spontaneously  SKIN:  no ulcers, lesions or rashes    LABS:                            11.4   16.09 )-----------( 354      ( 26 Mar 2019 06:18 )             32.5     03-26    135  |  101  |  21  ----------------------------<  117<H>  4.4   |  27  |  0.82    Ca    8.5      26 Mar 2019 06:18                                              CULTURES:

## 2019-03-26 NOTE — PROGRESS NOTE ADULT - SUBJECTIVE AND OBJECTIVE BOX
Subjective:    Review of Systems:  All 10 systems reviewed in detailed and found to be negative with the exception of what has already been described above    Allergies:  No Known Allergies    Meds  MEDICATIONS  (STANDING):  ALBUTerol/ipratropium for Nebulization 3 milliLiter(s) Nebulizer every 6 hours  atorvastatin 40 milliGRAM(s) Oral at bedtime  buDESOnide   0.5 milliGRAM(s) Respule 0.5 milliGRAM(s) Inhalation two times a day  methylPREDNISolone sodium succinate Injectable 40 milliGRAM(s) IV Push every 8 hours    MEDICATIONS  (PRN):  acetaminophen   Tablet .. 650 milliGRAM(s) Oral every 6 hours PRN Temp greater or equal to 38C (100.4F), Mild Pain (1 - 3)    Physical Exam  T(C): 36.6 (03-26-19 @ 04:53), Max: 37.1 (03-25-19 @ 20:12)  HR: 79 (03-26-19 @ 04:53) (78 - 103)  BP: 98/52 (03-26-19 @ 04:53) (98/52 - 108/59)  RR: 17 (03-26-19 @ 04:53) (16 - 18)  SpO2: 94% (03-26-19 @ 04:53) (93% - 94%)  Gen: Alert, oriented, no distress, cachectic  HEENT: Anicteric sclera, moist mucous membranes, no JVD, no lymphadenopathy, good dentition  Cardio: Regular rhythm and rate, normal S1S2, no murmurs  Resp: Clear to auscultation bilaterally, no wheezing or rhonchi  GI: Nontender, nondistended, normoactive bowel sounds  Ext: No cyanosis, clubbing or edema  Neuro: Nonfocal    Labs:    < from: CT Angio Chest PE Protocol w/ IV Cont (03.22.19 @ 17:53) >  XAM:  CTA CHEST PE PROTOCOL (W)AW IC                            PROCEDURE DATE:  03/22/2019          INTERPRETATION:  CTA CHEST PE PROTOCOL (W)AW IC    HISTORY:  Hemoptysis    TECHNIQUE: Contrast enhanced CT pulmonary angiogram was performed.   Multiplanar CT and HRCT images were reviewed. Maximum intensity   projection (MIP) images are reconstructed as per CT angiography protocol.   Images were acquired during the administration of 90 cc Omnipaque 350 IV   contrast. This study was performed using automatic exposure control   (radiation dose reduction software) to obtain a diagnostic image quality   scan with patient dose as low as reasonably achievable.    COMPARISON: None.    PULMONARY ARTERIES: No pulmonary embolism. Right upper lobe pulmonary   artery and interlobar artery are encased and narrowed by tumor.    LUNGS, AIRWAYS: Saber-sheath configuration of the trachea consistent with   COPD. Centrilobular emphysema. 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 Distal tree-in-bud   opacities in the right upper lobe.    PLEURA: No pleural abnormality.    VESSELS: Normal caliber aorta.    HEART: Normal heart size. No pericardial effusion. Coronary artery   calcifications are present.    MEDIASTINUM, ABDOULAYE, AXILLAE: Subcarinal and right hilar soft tissue   invasion.    UPPER ABDOMEN: Limited visualization is unremarkable.    BONES AND CHEST WALL: No aggressive lesion.    IMPRESSION:     No pulmonary embolism.    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 Right upper lobe pulmonary artery and interlobar artery are   encased and narrowed by tumor. Subcarinal and right hilar soft tissue   invasion.      < end of copied text > Subjective:  Continues to have cough  Sparse hemoptysis  Denies SOB    Review of Systems:  All 10 systems reviewed in detailed and found to be negative with the exception of what has already been described above    Allergies:  No Known Allergies    Meds  MEDICATIONS  (STANDING):  ALBUTerol/ipratropium for Nebulization 3 milliLiter(s) Nebulizer every 6 hours  atorvastatin 40 milliGRAM(s) Oral at bedtime  buDESOnide   0.5 milliGRAM(s) Respule 0.5 milliGRAM(s) Inhalation two times a day  methylPREDNISolone sodium succinate Injectable 40 milliGRAM(s) IV Push every 8 hours    MEDICATIONS  (PRN):  acetaminophen   Tablet .. 650 milliGRAM(s) Oral every 6 hours PRN Temp greater or equal to 38C (100.4F), Mild Pain (1 - 3)    Physical Exam  T(C): 36.6 (03-26-19 @ 04:53), Max: 37.1 (03-25-19 @ 20:12)  HR: 79 (03-26-19 @ 04:53) (78 - 103)  BP: 98/52 (03-26-19 @ 04:53) (98/52 - 108/59)  RR: 17 (03-26-19 @ 04:53) (16 - 18)  SpO2: 94% (03-26-19 @ 04:53) (93% - 94%)  Gen: Alert, oriented, no distress, cachectic  HEENT: Anicteric sclera, moist mucous membranes, no JVD, no lymphadenopathy, good dentition  Cardio: Regular rhythm and rate, normal S1S2, no murmurs  Resp: Clear to auscultation bilaterally, no wheezing or rhonchi  GI: Nontender, nondistended, normoactive bowel sounds  Ext: No cyanosis, clubbing or edema  Neuro: Nonfocal  Psych: odd affect    Labs:    < from: CT Angio Chest PE Protocol w/ IV Cont (03.22.19 @ 17:53) >  XAM:  CTA CHEST PE PROTOCOL (W)AW IC                            PROCEDURE DATE:  03/22/2019          INTERPRETATION:  CTA CHEST PE PROTOCOL (W)AW IC    HISTORY:  Hemoptysis    TECHNIQUE: Contrast enhanced CT pulmonary angiogram was performed.   Multiplanar CT and HRCT images were reviewed. Maximum intensity   projection (MIP) images are reconstructed as per CT angiography protocol.   Images were acquired during the administration of 90 cc Omnipaque 350 IV   contrast. This study was performed using automatic exposure control   (radiation dose reduction software) to obtain a diagnostic image quality   scan with patient dose as low as reasonably achievable.    COMPARISON: None.    PULMONARY ARTERIES: No pulmonary embolism. Right upper lobe pulmonary   artery and interlobar artery are encased and narrowed by tumor.    LUNGS, AIRWAYS: Saber-sheath configuration of the trachea consistent with   COPD. Centrilobular emphysema. 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 Distal tree-in-bud   opacities in the right upper lobe.    PLEURA: No pleural abnormality.    VESSELS: Normal caliber aorta.    HEART: Normal heart size. No pericardial effusion. Coronary artery   calcifications are present.    MEDIASTINUM, ABDOULAYE, AXILLAE: Subcarinal and right hilar soft tissue   invasion.    UPPER ABDOMEN: Limited visualization is unremarkable.    BONES AND CHEST WALL: No aggressive lesion.    IMPRESSION:     No pulmonary embolism.    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 Right upper lobe pulmonary artery and interlobar artery are   encased and narrowed by tumor. Subcarinal and right hilar soft tissue   invasion.      < end of copied text >

## 2019-03-26 NOTE — PROGRESS NOTE ADULT - ASSESSMENT
· Assessment		    # Hemoptysis with recent Dx of Lung Mass and possible Lung CA  # COPD exacerbation  # DVT ppx    - CT scan reviewed. Has 4cm cavitary lesion encasing RUL bronchus w hilar node  - d/w  and bronchoscopy tomm at 10am or so  - Pulm and cardio/thoracic Sx eval appreciated.   - continue albuterol/budesonide/iv steroids

## 2019-03-26 NOTE — PROGRESS NOTE ADULT - SUBJECTIVE AND OBJECTIVE BOX
Subjective:  Patient states he still has a productive cough.  Plan for bronchoscopy by pulmonology tomorrow.  Patient feels anxious.    Vital Signs:  Vital Signs Last 24 Hrs  T(C): 36.6 (03-26-19 @ 04:53), Max: 37.1 (03-25-19 @ 20:12)  T(F): 97.9 (03-26-19 @ 04:53), Max: 98.7 (03-25-19 @ 20:12)  HR: 79 (03-26-19 @ 04:53) (79 - 103)  BP: 98/52 (03-26-19 @ 04:53) (98/52 - 108/59)  RR: 17 (03-26-19 @ 04:53) (16 - 18)  SpO2: 94% (03-26-19 @ 04:53) (93% - 94%) on RA      Relevant labs, radiology and Medications reviewed                        11.4   16.09 )-----------( 354      ( 26 Mar 2019 06:18 )             32.5     03-26    135  |  101  |  21  ----------------------------<  117<H>  4.4   |  27  |  0.82    Ca    8.5      26 Mar 2019 06:18        MEDICATIONS  (STANDING):  ALBUTerol/ipratropium for Nebulization 3 milliLiter(s) Nebulizer every 6 hours  atorvastatin 40 milliGRAM(s) Oral at bedtime  buDESOnide   0.5 milliGRAM(s) Respule 0.5 milliGRAM(s) Inhalation two times a day  methylPREDNISolone sodium succinate Injectable 40 milliGRAM(s) IV Push every 8 hours    MEDICATIONS  (PRN):  acetaminophen   Tablet .. 650 milliGRAM(s) Oral every 6 hours PRN Temp greater or equal to 38C (100.4F), Mild Pain (1 - 3)      Physical exam  Gen: NAD, breathing comfortably.  Neuro: AO x 3.  Card: S1 S2.  Pulm: Bilateral rhonchi and crackles.  Abd: Soft NT ND.  Ext: No edema.  Sputum cloudy yellow-green, minimal blood.    I&O's Summary    25 Mar 2019 07:01  -  26 Mar 2019 07:00  --------------------------------------------------------  IN: 0 mL / OUT: 725 mL / NET: -725 mL        Assessment  64y Male  w/ PAST MEDICAL & SURGICAL HISTORY:  admitted with complaints of coughing up blood (25 Mar 2019 15:41)  .    PLAN    Pulm: Encourage coughing, deep breathing and use of incentive spirometry. Follow up sputum culture, bronchoscopy tomorrow with pulm +/- IR embolization.  GI: Tolerating diet.   Heme: Stable H/H.   Therapy: OOB/ambulate, PT    Discussed with Cardiothoracic Team at AM rounds.

## 2019-03-27 ENCOUNTER — RESULT REVIEW (OUTPATIENT)
Age: 64
End: 2019-03-27

## 2019-03-27 LAB
GLUCOSE BLDC GLUCOMTR-MCNC: 123 MG/DL — HIGH (ref 70–99)
HCT VFR BLD CALC: 32.8 % — LOW (ref 39–50)
HGB BLD-MCNC: 11.4 G/DL — LOW (ref 13–17)
MCHC RBC-ENTMCNC: 30.6 PG — SIGNIFICANT CHANGE UP (ref 27–34)
MCHC RBC-ENTMCNC: 34.8 GM/DL — SIGNIFICANT CHANGE UP (ref 32–36)
MCV RBC AUTO: 87.9 FL — SIGNIFICANT CHANGE UP (ref 80–100)
NRBC # BLD: 0 /100 WBCS — SIGNIFICANT CHANGE UP (ref 0–0)
PLATELET # BLD AUTO: 344 K/UL — SIGNIFICANT CHANGE UP (ref 150–400)
RBC # BLD: 3.73 M/UL — LOW (ref 4.2–5.8)
RBC # FLD: 13.2 % — SIGNIFICANT CHANGE UP (ref 10.3–14.5)
WBC # BLD: 16.2 K/UL — HIGH (ref 3.8–10.5)
WBC # FLD AUTO: 16.2 K/UL — HIGH (ref 3.8–10.5)

## 2019-03-27 PROCEDURE — 88305 TISSUE EXAM BY PATHOLOGIST: CPT | Mod: 26

## 2019-03-27 PROCEDURE — 88104 CYTOPATH FL NONGYN SMEARS: CPT | Mod: 26

## 2019-03-27 PROCEDURE — 99232 SBSQ HOSP IP/OBS MODERATE 35: CPT

## 2019-03-27 RX ORDER — OXYCODONE HYDROCHLORIDE 5 MG/1
5 TABLET ORAL ONCE
Qty: 0 | Refills: 0 | Status: DISCONTINUED | OUTPATIENT
Start: 2019-03-27 | End: 2019-03-27

## 2019-03-27 RX ORDER — ACETAMINOPHEN 500 MG
1000 TABLET ORAL ONCE
Qty: 0 | Refills: 0 | Status: DISCONTINUED | OUTPATIENT
Start: 2019-03-27 | End: 2019-03-27

## 2019-03-27 RX ORDER — ONDANSETRON 8 MG/1
4 TABLET, FILM COATED ORAL EVERY 6 HOURS
Qty: 0 | Refills: 0 | Status: DISCONTINUED | OUTPATIENT
Start: 2019-03-27 | End: 2019-03-27

## 2019-03-27 RX ORDER — SODIUM CHLORIDE 9 MG/ML
1000 INJECTION, SOLUTION INTRAVENOUS
Qty: 0 | Refills: 0 | Status: DISCONTINUED | OUTPATIENT
Start: 2019-03-27 | End: 2019-03-27

## 2019-03-27 RX ORDER — FENTANYL CITRATE 50 UG/ML
50 INJECTION INTRAVENOUS
Qty: 0 | Refills: 0 | Status: DISCONTINUED | OUTPATIENT
Start: 2019-03-27 | End: 2019-03-27

## 2019-03-27 RX ORDER — LANOLIN ALCOHOL/MO/W.PET/CERES
5 CREAM (GRAM) TOPICAL ONCE
Qty: 0 | Refills: 0 | Status: COMPLETED | OUTPATIENT
Start: 2019-03-27 | End: 2019-03-27

## 2019-03-27 RX ADMIN — Medication 0.5 MILLIGRAM(S): at 07:51

## 2019-03-27 RX ADMIN — Medication 40 MILLIGRAM(S): at 05:37

## 2019-03-27 RX ADMIN — Medication 40 MILLIGRAM(S): at 15:20

## 2019-03-27 RX ADMIN — ATORVASTATIN CALCIUM 40 MILLIGRAM(S): 80 TABLET, FILM COATED ORAL at 21:18

## 2019-03-27 RX ADMIN — Medication 0.5 MILLIGRAM(S): at 22:13

## 2019-03-27 RX ADMIN — SODIUM CHLORIDE 75 MILLILITER(S): 9 INJECTION, SOLUTION INTRAVENOUS at 12:09

## 2019-03-27 RX ADMIN — Medication 3 MILLILITER(S): at 22:13

## 2019-03-27 RX ADMIN — Medication 5 MILLIGRAM(S): at 21:59

## 2019-03-27 RX ADMIN — Medication 40 MILLIGRAM(S): at 21:18

## 2019-03-27 RX ADMIN — Medication 3 MILLILITER(S): at 02:36

## 2019-03-27 RX ADMIN — Medication 3 MILLILITER(S): at 07:50

## 2019-03-27 NOTE — PROGRESS NOTE ADULT - ASSESSMENT
· Assessment		    # Hemoptysis with recent Dx of Lung Mass and possible Lung CA  # COPD exacerbation  # DVT ppx    - CT scan reviewed. Has 4cm cavitary lesion encasing RUL bronchus w hilar node  - d/w  and bronchoscopy today  - Pulm and cardio/thoracic Sx eval appreciated.   - continue albuterol/budesonide/iv steroids

## 2019-03-27 NOTE — PROGRESS NOTE ADULT - SUBJECTIVE AND OBJECTIVE BOX
HOSPITALIST ATTENDING PROGRESS NOTE    Chart and meds reviewed.  Patient seen and examined.    HPI: S: 3/25/19 pt seen and examined, says has seen  as outpatietn, was told may have malignancy, quit smoking 2 weeks ago, 44 years x 2 packs per day, less hemoptysis today. Lives with mom.     3/26/19 pt seen and examined, discussed with brother and mom    3/27/19 pt seen and examined, for bronchoscopy today.     All 10 systems reviewed and found to be negative with the exception of what has been described above.    MEDICATIONS  (STANDING):  ALBUTerol/ipratropium for Nebulization 3 milliLiter(s) Nebulizer every 6 hours  atorvastatin 40 milliGRAM(s) Oral at bedtime  buDESOnide   0.5 milliGRAM(s) Respule 0.5 milliGRAM(s) Inhalation two times a day  methylPREDNISolone sodium succinate Injectable 40 milliGRAM(s) IV Push every 8 hours    MEDICATIONS  (PRN):  acetaminophen   Tablet .. 650 milliGRAM(s) Oral every 6 hours PRN Temp greater or equal to 38C (100.4F), Mild Pain (1 - 3)      VITALS:  T(F): 98.3 (03-27-19 @ 12:55), Max: 98.7 (03-27-19 @ 11:21)  HR: 91 (03-27-19 @ 12:55) (76 - 107)  BP: 107/62 (03-27-19 @ 12:55) (103/57 - 133/80)  RR: 22 (03-27-19 @ 12:55) (17 - 22)  SpO2: 95% (03-27-19 @ 12:55) (92% - 97%)  Wt(kg): --    I&O's Summary    26 Mar 2019 07:01  -  27 Mar 2019 07:00  --------------------------------------------------------  IN: 0 mL / OUT: 500 mL / NET: -500 mL    27 Mar 2019 07:01  -  27 Mar 2019 15:44  --------------------------------------------------------  IN: 500 mL / OUT: 0 mL / NET: 500 mL        CAPILLARY BLOOD GLUCOSE      POCT Blood Glucose.: 123 mg/dL (27 Mar 2019 05:42)  POCT Blood Glucose.: 154 mg/dL (26 Mar 2019 21:21)      PHYSICAL EXAM:    HEENT:  pupils equal and reactive, EOMI, no oropharyngeal lesions, erythema, exudates, oral thrush  NECK:   supple, no carotid bruits, no palpable lymph nodes, no thyromegaly  CV:  +S1, +S2, regular, no murmurs or rubs  RESP:   lungs clear to auscultation bilaterally, no wheezing, rales, rhonchi, good air entry bilaterally  BREAST:  not examined  GI:  abdomen soft, non-tender, non-distended, normal BS, no bruits, no abdominal masses, no palpable masses  RECTAL:  not examined  :  not examined  MSK:   normal muscle tone, no atrophy, no rigidity, no contractions  EXT:  no clubbing, no cyanosis, no edema, no calf pain, swelling or erythema  VASCULAR:  pulses equal and symmetric in the upper and lower extremities  NEURO:  AAOX3, no focal neurological deficits, follows all commands, able to move extremities spontaneously  SKIN:  no ulcers, lesions or rashes    LABS:                            11.4   16.20 )-----------( 344      ( 27 Mar 2019 05:32 )             32.8     03-26    135  |  101  |  21  ----------------------------<  117<H>  4.4   |  27  |  0.82    Ca    8.5      26 Mar 2019 06:18                                              CULTURES:

## 2019-03-27 NOTE — PROGRESS NOTE ADULT - ASSESSMENT
- hemoptysis likely secondary to lung cancer  - severe copd with exacerbation  - active smoker   - HLD    PLAN     - can titrate to methylpred q12 today  - thoracic following  - discussed with thoracic, bronchoscopy will need for localization of lesion in order for cauterization  - IR also consulted  - Bronch today  - Spoke to Heme onc, still would need tissue biopsy in the long run to decide on treatment

## 2019-03-27 NOTE — BRIEF OPERATIVE NOTE - OPERATION/FINDINGS
The bronchoscope was inserted through the ET tube. It was then advanced, the airways were examined bilaterally, and examined to the subsegmental level. The trachea appeared normal. The left bronchial tree was examined to the subsegmental level and appeared normal. The at the take off of the right mainstem bronchus, there was a mucous covered cobblestone surface, that opened up into a large cavitary lesion with thick mucous, irregular surfaces, friability and dark changes. It looked as if the airway and surrounding parenchyma was obliterated; at some points, there was a hole in the lesion that opened up to what appeared to be a normal airway. This was washed, brushed and then washed again    Specimens Obtained:   LLL BAL  General washing x 2    Estimated Blood Loss: Condition    Condition s/p Procedure: Unchanged    Plan:

## 2019-03-27 NOTE — PROGRESS NOTE ADULT - SUBJECTIVE AND OBJECTIVE BOX
Subjective:  Patient is breathing comfortably.  No episodes of hemoptysis after bronchoscopy.    Vital Signs:  Vital Signs Last 24 Hrs  T(C): 36.8 (03-27-19 @ 12:55), Max: 37.1 (03-27-19 @ 11:21)  T(F): 98.3 (03-27-19 @ 12:55), Max: 98.7 (03-27-19 @ 11:21)  HR: 91 (03-27-19 @ 12:55) (76 - 107)  BP: 107/62 (03-27-19 @ 12:55) (103/57 - 133/80)  RR: 22 (03-27-19 @ 12:55) (17 - 22)  SpO2: 95% (03-27-19 @ 12:55) (92% - 97%) on RA.      Relevant labs, radiology and Medications reviewed                        11.4   16.20 )-----------( 344      ( 27 Mar 2019 05:32 )             32.8     03-26    135  |  101  |  21  ----------------------------<  117<H>  4.4   |  27  |  0.82    Ca    8.5      26 Mar 2019 06:18        MEDICATIONS  (STANDING):  ALBUTerol/ipratropium for Nebulization 3 milliLiter(s) Nebulizer every 6 hours  atorvastatin 40 milliGRAM(s) Oral at bedtime  buDESOnide   0.5 milliGRAM(s) Respule 0.5 milliGRAM(s) Inhalation two times a day  methylPREDNISolone sodium succinate Injectable 40 milliGRAM(s) IV Push every 8 hours    MEDICATIONS  (PRN):  acetaminophen   Tablet .. 650 milliGRAM(s) Oral every 6 hours PRN Temp greater or equal to 38C (100.4F), Mild Pain (1 - 3)      I&O's Summary    26 Mar 2019 07:01  -  27 Mar 2019 07:00  --------------------------------------------------------  IN: 0 mL / OUT: 500 mL / NET: -500 mL    27 Mar 2019 07:01  -  27 Mar 2019 14:56  --------------------------------------------------------  IN: 500 mL / OUT: 0 mL / NET: 500 mL      Physical exam  Gen: NAD, breathing comfortably.  Neuro: AO x 3.  Card: S1 S2.  Pulm: Bilateral rhonchi and crackles.  Abd: Soft NT ND.  Ext: No edema.    Assessment  64y Male  w/ PAST MEDICAL & SURGICAL HISTORY:  admitted with complaints of coughing up blood (25 Mar 2019 15:41)      3/27/19  Bronchoscopy  .    PLAN    Pulm: Encourage coughing, deep breathing and use of incentive spirometry. Follow up bronchoscopy results.  GI: Tolerating diet.   Heme: Stable H/H.   Therapy: OOB/ambulate, PT    Discussed with Cardiothoracic Team at AM rounds.

## 2019-03-27 NOTE — PROGRESS NOTE ADULT - SUBJECTIVE AND OBJECTIVE BOX
Subjective: To hemoptysis  NPO at midnight  Still has cough    Review of Systems:  All 10 systems reviewed in detailed and found to be negative with the exception of what has already been described above    Allergies:  No Known Allergies    Meds  MEDICATIONS  (STANDING):  ALBUTerol/ipratropium for Nebulization 3 milliLiter(s) Nebulizer every 6 hours  atorvastatin 40 milliGRAM(s) Oral at bedtime  buDESOnide   0.5 milliGRAM(s) Respule 0.5 milliGRAM(s) Inhalation two times a day  methylPREDNISolone sodium succinate Injectable 40 milliGRAM(s) IV Push every 8 hours    MEDICATIONS  (PRN):  acetaminophen   Tablet .. 650 milliGRAM(s) Oral every 6 hours PRN Temp greater or equal to 38C (100.4F), Mild Pain (1 - 3)    Physical Exam  T(C): 36.6 (03-27-19 @ 04:15), Max: 37.2 (03-26-19 @ 13:15)  HR: 83 (03-27-19 @ 04:15) (76 - 92)  BP: 103/57 (03-27-19 @ 04:15) (103/57 - 115/69)  RR: 17 (03-27-19 @ 04:15) (16 - 17)  SpO2: 93% (03-27-19 @ 04:15) (93% - 96%)  Gen: Alert, oriented, no distress, cachectic  HEENT: Anicteric sclera, moist mucous membranes, no JVD, no lymphadenopathy, good dentition  Cardio: Regular rhythm and rate, normal S1S2, no murmurs  Resp: Clear to auscultation bilaterally, no wheezing or rhonchi  GI: Nontender, nondistended, normoactive bowel sounds  Ext: No cyanosis, clubbing or edema  Neuro: Nonfocal  Psych: odd affect    Labs:                        11.4   16.20 )-----------( 344      ( 27 Mar 2019 05:32 )             32.8     03-26    135  |  101  |  21  ----------------------------<  117<H>  4.4   |  27  |  0.82    Ca    8.5      26 Mar 2019 06:18    < from: CT Angio Chest PE Protocol w/ IV Cont (03.22.19 @ 17:53) >  XAM:  CTA CHEST PE PROTOCOL (W)AW IC                            PROCEDURE DATE:  03/22/2019          INTERPRETATION:  CTA CHEST PE PROTOCOL (W)AW IC    HISTORY:  Hemoptysis    TECHNIQUE: Contrast enhanced CT pulmonary angiogram was performed.   Multiplanar CT and HRCT images were reviewed. Maximum intensity   projection (MIP) images are reconstructed as per CT angiography protocol.   Images were acquired during the administration of 90 cc Omnipaque 350 IV   contrast. This study was performed using automatic exposure control   (radiation dose reduction software) to obtain a diagnostic image quality   scan with patient dose as low as reasonably achievable.    COMPARISON: None.    PULMONARY ARTERIES: No pulmonary embolism. Right upper lobe pulmonary   artery and interlobar artery are encased and narrowed by tumor.    LUNGS, AIRWAYS: Saber-sheath configuration of the trachea consistent with   COPD. Centrilobular emphysema. 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 Distal tree-in-bud   opacities in the right upper lobe.    PLEURA: No pleural abnormality.    VESSELS: Normal caliber aorta.    HEART: Normal heart size. No pericardial effusion. Coronary artery   calcifications are present.    MEDIASTINUM, ABDOULAYE, AXILLAE: Subcarinal and right hilar soft tissue   invasion.    UPPER ABDOMEN: Limited visualization is unremarkable.    BONES AND CHEST WALL: No aggressive lesion.    IMPRESSION:     No pulmonary embolism.    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 Right upper lobe pulmonary artery and interlobar artery are   encased and narrowed by tumor. Subcarinal and right hilar soft tissue   invasion.      < end of copied text >

## 2019-03-28 LAB
GRAM STN FLD: SIGNIFICANT CHANGE UP
HCT VFR BLD CALC: 34.2 % — LOW (ref 39–50)
HGB BLD-MCNC: 11.8 G/DL — LOW (ref 13–17)
MCHC RBC-ENTMCNC: 30.1 PG — SIGNIFICANT CHANGE UP (ref 27–34)
MCHC RBC-ENTMCNC: 34.5 GM/DL — SIGNIFICANT CHANGE UP (ref 32–36)
MCV RBC AUTO: 87.2 FL — SIGNIFICANT CHANGE UP (ref 80–100)
NIGHT BLUE STAIN TISS: SIGNIFICANT CHANGE UP
NRBC # BLD: 0 /100 WBCS — SIGNIFICANT CHANGE UP (ref 0–0)
PLATELET # BLD AUTO: 377 K/UL — SIGNIFICANT CHANGE UP (ref 150–400)
RBC # BLD: 3.92 M/UL — LOW (ref 4.2–5.8)
RBC # FLD: 13.1 % — SIGNIFICANT CHANGE UP (ref 10.3–14.5)
SPECIMEN SOURCE: SIGNIFICANT CHANGE UP
WBC # BLD: 17.12 K/UL — HIGH (ref 3.8–10.5)
WBC # FLD AUTO: 17.12 K/UL — HIGH (ref 3.8–10.5)

## 2019-03-28 PROCEDURE — 99232 SBSQ HOSP IP/OBS MODERATE 35: CPT

## 2019-03-28 RX ORDER — LANOLIN ALCOHOL/MO/W.PET/CERES
3 CREAM (GRAM) TOPICAL ONCE
Qty: 0 | Refills: 0 | Status: COMPLETED | OUTPATIENT
Start: 2019-03-28 | End: 2019-03-28

## 2019-03-28 RX ADMIN — Medication 3 MILLILITER(S): at 07:55

## 2019-03-28 RX ADMIN — Medication 3 MILLILITER(S): at 13:53

## 2019-03-28 RX ADMIN — Medication 3 MILLIGRAM(S): at 22:22

## 2019-03-28 RX ADMIN — Medication 0.5 MILLIGRAM(S): at 07:56

## 2019-03-28 RX ADMIN — Medication 0.5 MILLIGRAM(S): at 20:59

## 2019-03-28 RX ADMIN — Medication 40 MILLIGRAM(S): at 17:35

## 2019-03-28 RX ADMIN — ATORVASTATIN CALCIUM 40 MILLIGRAM(S): 80 TABLET, FILM COATED ORAL at 21:23

## 2019-03-28 RX ADMIN — Medication 3 MILLILITER(S): at 01:17

## 2019-03-28 RX ADMIN — Medication 3 MILLILITER(S): at 20:59

## 2019-03-28 RX ADMIN — Medication 40 MILLIGRAM(S): at 05:18

## 2019-03-28 NOTE — PROGRESS NOTE ADULT - ASSESSMENT
- hemoptysis likely secondary to lung cancer  - severe copd with exacerbation  - active smoker   - HLD    PLAN     - switch to methylpred q12 today, and PO prednisone in AM  - thoracic following  - discussed with thoracic  - s/p Bronch, follow up cytology and cultures  - do not suspect he needs antibiotics currently despite gram positive cocci, await speciation  - will present to tumor board tomorrow  - likely d/c tomorrow

## 2019-03-28 NOTE — PROGRESS NOTE ADULT - SUBJECTIVE AND OBJECTIVE BOX
Subjective:  3 yo male with a PMHx of HLD on Lipitor, recent d/o RUL hilar lung mass, severe copd, ex smoker (quit 2 wks ago, ~80pack year history) admitted w hemoptysis 3/25/19. He had out patient pet positive scan with  no uptake in the mediastinal lymph nodes as per documentation. Pt is now s/p bronch w BAL 3/27/19  3/25 Pt denies hemoptysis currently, only coughing up sputum.    Vital Signs:  Vital Signs Last 24 Hrs  T(C): 36.9 (03-28-19 @ 04:13), Max: 37.3 (03-27-19 @ 20:29)  T(F): 98.5 (03-28-19 @ 04:13), Max: 99.2 (03-27-19 @ 20:29)  HR: 75 (03-28-19 @ 04:13) (75 - 107)  BP: 109/66 (03-28-19 @ 04:13) (99/68 - 133/80)  RR: 18 (03-28-19 @ 04:13) (17 - 22)  SpO2: 94% (03-28-19 @ 04:13) (92% - 97%) on (O2)    Telemetry/Alarms:    Relevant labs, radiology and Medications reviewed                        11.8   17.12 )-----------( 377      ( 28 Mar 2019 05:49 )             34.2             MEDICATIONS  (STANDING):  ALBUTerol/ipratropium for Nebulization 3 milliLiter(s) Nebulizer every 6 hours  atorvastatin 40 milliGRAM(s) Oral at bedtime  buDESOnide   0.5 milliGRAM(s) Respule 0.5 milliGRAM(s) Inhalation two times a day  methylPREDNISolone sodium succinate Injectable 40 milliGRAM(s) IV Push every 8 hours    MEDICATIONS  (PRN):  acetaminophen   Tablet .. 650 milliGRAM(s) Oral every 6 hours PRN Temp greater or equal to 38C (100.4F), Mild Pain (1 - 3)      Physical exam  Gen NAD  Neuro AAOx3  Card RRR  Pulm exp wheeze right side  Abd soft  Ext warm        I&O's Summary    27 Mar 2019 07:01  -  28 Mar 2019 07:00  --------------------------------------------------------  IN: 500 mL / OUT: 600 mL / NET: -100 mL        Assessment  64y Male  w/ PAST MEDICAL & SURGICAL HISTORY:  admitted with complaints of Patient is a 64y old  Male who presents with a chief complaint of complain of coughing up blood (27 Mar 2019 15:44)  s/p Bronchoscopy with brush biopsy of lung  . Postoperative course/issues:    PLAN  f/u samples from BAL  no further hemoptysis  no acute thoracic intervention    Discussed with Cardiothoracic Team at AM rounds. Subjective:  70 yo male with a PMHx of HLD on Lipitor, recent d/o RUL hilar lung mass, severe copd, ex smoker (quit 2 wks ago, ~80pack year history) admitted w hemoptysis 3/25/19. He had out patient pet positive scan with  no uptake in the mediastinal lymph nodes as per documentation. Pt is now s/p bronch w BAL 3/27/19  3/28/19 Pt denies hemoptysis currently, only coughing up sputum.    Vital Signs:  Vital Signs Last 24 Hrs  T(C): 36.9 (03-28-19 @ 04:13), Max: 37.3 (03-27-19 @ 20:29)  T(F): 98.5 (03-28-19 @ 04:13), Max: 99.2 (03-27-19 @ 20:29)  HR: 75 (03-28-19 @ 04:13) (75 - 107)  BP: 109/66 (03-28-19 @ 04:13) (99/68 - 133/80)  RR: 18 (03-28-19 @ 04:13) (17 - 22)  SpO2: 94% (03-28-19 @ 04:13) (92% - 97%) on (O2)    Telemetry/Alarms:    Relevant labs, radiology and Medications reviewed                        11.8   17.12 )-----------( 377      ( 28 Mar 2019 05:49 )             34.2             MEDICATIONS  (STANDING):  ALBUTerol/ipratropium for Nebulization 3 milliLiter(s) Nebulizer every 6 hours  atorvastatin 40 milliGRAM(s) Oral at bedtime  buDESOnide   0.5 milliGRAM(s) Respule 0.5 milliGRAM(s) Inhalation two times a day  methylPREDNISolone sodium succinate Injectable 40 milliGRAM(s) IV Push every 8 hours    MEDICATIONS  (PRN):  acetaminophen   Tablet .. 650 milliGRAM(s) Oral every 6 hours PRN Temp greater or equal to 38C (100.4F), Mild Pain (1 - 3)      Physical exam  Gen NAD  Neuro AAOx3  Card RRR  Pulm exp wheeze right side  Abd soft  Ext warm        I&O's Summary    27 Mar 2019 07:01  -  28 Mar 2019 07:00  --------------------------------------------------------  IN: 500 mL / OUT: 600 mL / NET: -100 mL        Assessment  64y Male  w/ PAST MEDICAL & SURGICAL HISTORY:  admitted with complaints of Patient is a 64y old  Male who presents with a chief complaint of complain of coughing up blood (27 Mar 2019 15:44)  s/p Bronchoscopy with brush biopsy of lung  . Postoperative course/issues:    PLAN  f/u samples from BAL  no further hemoptysis  no acute thoracic intervention    Discussed with Cardiothoracic Team at AM rounds.

## 2019-03-28 NOTE — PROGRESS NOTE ADULT - SUBJECTIVE AND OBJECTIVE BOX
Subjective:  Doing well after bronch  No hemoptysis    Review of Systems:  All 10 systems reviewed in detailed and found to be negative with the exception of what has already been described above    Allergies:  No Known Allergies    Meds  MEDICATIONS  (STANDING):  ALBUTerol/ipratropium for Nebulization 3 milliLiter(s) Nebulizer every 6 hours  atorvastatin 40 milliGRAM(s) Oral at bedtime  buDESOnide   0.5 milliGRAM(s) Respule 0.5 milliGRAM(s) Inhalation two times a day  methylPREDNISolone sodium succinate Injectable 40 milliGRAM(s) IV Push every 8 hours    MEDICATIONS  (PRN):  acetaminophen   Tablet .. 650 milliGRAM(s) Oral every 6 hours PRN Temp greater or equal to 38C (100.4F), Mild Pain (1 - 3)    Physical Exam  T(C): 36.9 (03-28-19 @ 04:13), Max: 37.3 (03-27-19 @ 20:29)  HR: 75 (03-28-19 @ 04:13) (75 - 93)  BP: 109/66 (03-28-19 @ 04:13) (99/68 - 109/66)  RR: 18 (03-28-19 @ 04:13) (17 - 22)  SpO2: 94% (03-28-19 @ 04:13) (92% - 95%)  Gen: Alert, oriented, no distress  HEENT: Anicteric sclera, moist mucous membranes, no JVD, no lymphadenopathy, good dentition  Cardio: Regular rhythm and rate, normal S1S2, no murmurs  Resp: Clear to auscultation bilaterally, no wheezing or rhonchi  GI: Nontender, nondistended, normoactive bowel sounds  Ext: No cyanosis, clubbing or edema  Neuro: Nonfocal    Labs:                        11.8   17.12 )-----------( 377      ( 28 Mar 2019 05:49 )             34.2       < from: CT Angio Chest PE Protocol w/ IV Cont (03.22.19 @ 17:53) >  XAM:  CTA CHEST PE PROTOCOL (W)AW IC                            PROCEDURE DATE:  03/22/2019          INTERPRETATION:  CTA CHEST PE PROTOCOL (W)AW IC    HISTORY:  Hemoptysis    TECHNIQUE: Contrast enhanced CT pulmonary angiogram was performed.   Multiplanar CT and HRCT images were reviewed. Maximum intensity   projection (MIP) images are reconstructed as per CT angiography protocol.   Images were acquired during the administration of 90 cc Omnipaque 350 IV   contrast. This study was performed using automatic exposure control   (radiation dose reduction software) to obtain a diagnostic image quality   scan with patient dose as low as reasonably achievable.    COMPARISON: None.    PULMONARY ARTERIES: No pulmonary embolism. Right upper lobe pulmonary   artery and interlobar artery are encased and narrowed by tumor.    LUNGS, AIRWAYS: Saber-sheath configuration of the trachea consistent with   COPD. Centrilobular emphysema. 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 Distal tree-in-bud   opacities in the right upper lobe.    PLEURA: No pleural abnormality.    VESSELS: Normal caliber aorta.    HEART: Normal heart size. No pericardial effusion. Coronary artery   calcifications are present.    MEDIASTINUM, ABDOULAYE, AXILLAE: Subcarinal and right hilar soft tissue   invasion.    UPPER ABDOMEN: Limited visualization is unremarkable.    BONES AND CHEST WALL: No aggressive lesion.    IMPRESSION:     No pulmonary embolism.    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 Right upper lobe pulmonary artery and interlobar artery are   encased and narrowed by tumor. Subcarinal and right hilar soft tissue   invasion.      < end of copied text >

## 2019-03-28 NOTE — PROGRESS NOTE ADULT - ASSESSMENT
· Assessment		    # Hemoptysis with recent Dx of Lung Mass and possible Lung CA  # COPD exacerbation  # DVT ppx    - CT scan reviewed. Has 4cm cavitary lesion encasing RUL bronchus w hilar node  - d/w  s/p bronchoscopy   - Pulm and cardio/thoracic Sx eval appreciated.   - continue albuterol/budesonide  - taper IV steroids to BID today  - PO steroid tomm  DC home tomm

## 2019-03-28 NOTE — PROGRESS NOTE ADULT - SUBJECTIVE AND OBJECTIVE BOX
HOSPITALIST ATTENDING PROGRESS NOTE    Chart and meds reviewed.  Patient seen and examined.    HPI: S: 3/25/19 pt seen and examined, says has seen  as outpatietn, was told may have malignancy, quit smoking 2 weeks ago, 44 years x 2 packs per day, less hemoptysis today. Lives with mom.     3/26/19 pt seen and examined, discussed with brother and mom    3/27/19 pt seen and examined, for bronchoscopy today.     3/28/19 pt seen and examined, taper steroids today to bid, d/w pulm.    All 10 systems reviewed and found to be negative with the exception of what has been described above.    MEDICATIONS  (STANDING):  ALBUTerol/ipratropium for Nebulization 3 milliLiter(s) Nebulizer every 6 hours  atorvastatin 40 milliGRAM(s) Oral at bedtime  buDESOnide   0.5 milliGRAM(s) Respule 0.5 milliGRAM(s) Inhalation two times a day  methylPREDNISolone sodium succinate Injectable 40 milliGRAM(s) IV Push every 12 hours    MEDICATIONS  (PRN):  acetaminophen   Tablet .. 650 milliGRAM(s) Oral every 6 hours PRN Temp greater or equal to 38C (100.4F), Mild Pain (1 - 3)      VITALS:  T(F): 98.4 (03-28-19 @ 12:34), Max: 99.2 (03-27-19 @ 20:29)  HR: 76 (03-28-19 @ 14:34) (75 - 94)  BP: 109/69 (03-28-19 @ 12:34) (99/68 - 109/69)  RR: 18 (03-28-19 @ 12:34) (18 - 18)  SpO2: 96% (03-28-19 @ 12:34) (92% - 96%)  Wt(kg): --    I&O's Summary    27 Mar 2019 07:01  -  28 Mar 2019 07:00  --------------------------------------------------------  IN: 500 mL / OUT: 600 mL / NET: -100 mL    28 Mar 2019 07:01  -  28 Mar 2019 16:19  --------------------------------------------------------  IN: 360 mL / OUT: 200 mL / NET: 160 mL        CAPILLARY BLOOD GLUCOSE          PHYSICAL EXAM:    HEENT:  pupils equal and reactive, EOMI, no oropharyngeal lesions, erythema, exudates, oral thrush  NECK:   supple, no carotid bruits, no palpable lymph nodes, no thyromegaly  CV:  +S1, +S2, regular, no murmurs or rubs  RESP:   lungs clear to auscultation bilaterally, no wheezing, rales, rhonchi, good air entry bilaterally  BREAST:  not examined  GI:  abdomen soft, non-tender, non-distended, normal BS, no bruits, no abdominal masses, no palpable masses  RECTAL:  not examined  :  not examined  MSK:   normal muscle tone, no atrophy, no rigidity, no contractions  EXT:  no clubbing, no cyanosis, no edema, no calf pain, swelling or erythema  VASCULAR:  pulses equal and symmetric in the upper and lower extremities  NEURO:  AAOX3, no focal neurological deficits, follows all commands, able to move extremities spontaneously  SKIN:  no ulcers, lesions or rashes    LABS:                            11.8   17.12 )-----------( 377      ( 28 Mar 2019 05:49 )             34.2                                                   CULTURES:

## 2019-03-29 PROBLEM — E78.5 HYPERLIPIDEMIA, UNSPECIFIED: Chronic | Status: INACTIVE | Noted: 2019-03-22 | Resolved: 2019-03-28

## 2019-03-29 LAB
-  AMPICILLIN/SULBACTAM: SIGNIFICANT CHANGE UP
-  CEFAZOLIN: SIGNIFICANT CHANGE UP
-  CLINDAMYCIN: SIGNIFICANT CHANGE UP
-  ERYTHROMYCIN: SIGNIFICANT CHANGE UP
-  GENTAMICIN: SIGNIFICANT CHANGE UP
-  OXACILLIN: SIGNIFICANT CHANGE UP
-  PENICILLIN: SIGNIFICANT CHANGE UP
-  RIFAMPIN: SIGNIFICANT CHANGE UP
-  TETRACYCLINE: SIGNIFICANT CHANGE UP
-  TRIMETHOPRIM/SULFAMETHOXAZOLE: SIGNIFICANT CHANGE UP
-  VANCOMYCIN: SIGNIFICANT CHANGE UP
CULTURE RESULTS: SIGNIFICANT CHANGE UP
CULTURE RESULTS: SIGNIFICANT CHANGE UP
HCT VFR BLD CALC: 33.5 % — LOW (ref 39–50)
HGB BLD-MCNC: 11.6 G/DL — LOW (ref 13–17)
MCHC RBC-ENTMCNC: 30.1 PG — SIGNIFICANT CHANGE UP (ref 27–34)
MCHC RBC-ENTMCNC: 34.6 GM/DL — SIGNIFICANT CHANGE UP (ref 32–36)
MCV RBC AUTO: 86.8 FL — SIGNIFICANT CHANGE UP (ref 80–100)
METHOD TYPE: SIGNIFICANT CHANGE UP
NRBC # BLD: 0 /100 WBCS — SIGNIFICANT CHANGE UP (ref 0–0)
ORGANISM # SPEC MICROSCOPIC CNT: SIGNIFICANT CHANGE UP
ORGANISM # SPEC MICROSCOPIC CNT: SIGNIFICANT CHANGE UP
PLATELET # BLD AUTO: 409 K/UL — HIGH (ref 150–400)
RBC # BLD: 3.86 M/UL — LOW (ref 4.2–5.8)
RBC # FLD: 13.2 % — SIGNIFICANT CHANGE UP (ref 10.3–14.5)
SPECIMEN SOURCE: SIGNIFICANT CHANGE UP
SPECIMEN SOURCE: SIGNIFICANT CHANGE UP
WBC # BLD: 19.15 K/UL — HIGH (ref 3.8–10.5)
WBC # FLD AUTO: 19.15 K/UL — HIGH (ref 3.8–10.5)

## 2019-03-29 PROCEDURE — 99232 SBSQ HOSP IP/OBS MODERATE 35: CPT

## 2019-03-29 PROCEDURE — 70553 MRI BRAIN STEM W/O & W/DYE: CPT | Mod: 26

## 2019-03-29 RX ORDER — VANCOMYCIN HCL 1 G
1000 VIAL (EA) INTRAVENOUS EVERY 12 HOURS
Qty: 0 | Refills: 0 | Status: DISCONTINUED | OUTPATIENT
Start: 2019-03-29 | End: 2019-03-31

## 2019-03-29 RX ORDER — CEFTRIAXONE 500 MG/1
1000 INJECTION, POWDER, FOR SOLUTION INTRAMUSCULAR; INTRAVENOUS EVERY 24 HOURS
Qty: 0 | Refills: 0 | Status: DISCONTINUED | OUTPATIENT
Start: 2019-03-29 | End: 2019-04-01

## 2019-03-29 RX ORDER — LANOLIN ALCOHOL/MO/W.PET/CERES
3 CREAM (GRAM) TOPICAL AT BEDTIME
Qty: 0 | Refills: 0 | Status: DISCONTINUED | OUTPATIENT
Start: 2019-03-29 | End: 2019-04-01

## 2019-03-29 RX ADMIN — CEFTRIAXONE 1000 MILLIGRAM(S): 500 INJECTION, POWDER, FOR SOLUTION INTRAMUSCULAR; INTRAVENOUS at 17:34

## 2019-03-29 RX ADMIN — Medication 3 MILLIGRAM(S): at 22:24

## 2019-03-29 RX ADMIN — Medication 40 MILLIGRAM(S): at 05:15

## 2019-03-29 RX ADMIN — Medication 250 MILLIGRAM(S): at 17:34

## 2019-03-29 RX ADMIN — Medication 0.5 MILLIGRAM(S): at 07:37

## 2019-03-29 RX ADMIN — Medication 3 MILLILITER(S): at 07:38

## 2019-03-29 RX ADMIN — ATORVASTATIN CALCIUM 40 MILLIGRAM(S): 80 TABLET, FILM COATED ORAL at 22:24

## 2019-03-29 RX ADMIN — Medication 40 MILLIGRAM(S): at 17:34

## 2019-03-29 RX ADMIN — Medication 3 MILLILITER(S): at 02:18

## 2019-03-29 RX ADMIN — Medication 3 MILLILITER(S): at 13:29

## 2019-03-29 NOTE — CONSULT NOTE ADULT - REASON FOR ADMISSION
complain of coughing up blood

## 2019-03-29 NOTE — PROGRESS NOTE ADULT - SUBJECTIVE AND OBJECTIVE BOX
Subjective:  72 yo male with a PMHx of HLD on Lipitor, recent d/o RUL hilar lung mass, severe copd, ex smoker (quit 2 wks ago, ~80pack year history) admitted w hemoptysis 3/25/19. He had out patient pet positive scan with  no uptake in the mediastinal lymph nodes as per documentation. Pt is now s/p bronch w BAL 3/27/19  3/25 Pt denies hemoptysis currently, only coughing up sputum.  3/29/19     Vital Signs:  Vital Signs Last 24 Hrs  T(C): 36.9 (03-29-19 @ 04:57), Max: 37 (03-28-19 @ 22:02)  T(F): 98.4 (03-29-19 @ 04:57), Max: 98.6 (03-28-19 @ 22:02)  HR: 85 (03-29-19 @ 04:57) (76 - 94)  BP: 104/71 (03-29-19 @ 04:57) (104/71 - 109/69)  RR: 18 (03-29-19 @ 04:57) (18 - 18)  SpO2: 93% (03-29-19 @ 04:57) (93% - 96%) on (O2)    Telemetry/Alarms:    Relevant labs, radiology and Medications reviewed                        11.8   17.12 )-----------( 377      ( 28 Mar 2019 05:49 )             34.2             MEDICATIONS  (STANDING):  ALBUTerol/ipratropium for Nebulization 3 milliLiter(s) Nebulizer every 6 hours  atorvastatin 40 milliGRAM(s) Oral at bedtime  buDESOnide   0.5 milliGRAM(s) Respule 0.5 milliGRAM(s) Inhalation two times a day  methylPREDNISolone sodium succinate Injectable 40 milliGRAM(s) IV Push every 12 hours    MEDICATIONS  (PRN):  acetaminophen   Tablet .. 650 milliGRAM(s) Oral every 6 hours PRN Temp greater or equal to 38C (100.4F), Mild Pain (1 - 3)      Physical exam  Gen  Neuro  Card  Pulm  Abd  Ext    Tubes:    I&O's Summary    27 Mar 2019 07:01  -  28 Mar 2019 07:00  --------------------------------------------------------  IN: 500 mL / OUT: 600 mL / NET: -100 mL    28 Mar 2019 07:01  -  29 Mar 2019 05:34  --------------------------------------------------------  IN: 360 mL / OUT: 200 mL / NET: 160 mL        Assessment  64y Male  w/ PAST MEDICAL & SURGICAL HISTORY:  HLD (hyperlipidemia)  admitted with complaints of Patient is a 64y old  Male who presents with a chief complaint of complain of coughing up blood (28 Mar 2019 16:19)  s/p  Bronchoscopy with brush biopsy of lung      PLAN  So far cultures growing staph aureus  hemoptysis improved  care as per pulm/med  no acute thoracic intervention    Discussed with Cardiothoracic Team at AM rounds. Subjective:  70 yo male with a PMHx of HLD on Lipitor, recent d/o RUL hilar lung mass, severe copd, ex smoker (quit 2 wks ago, ~80pack year history) admitted w hemoptysis 3/25/19. He had out patient pet positive scan with  no uptake in the mediastinal lymph nodes as per documentation. Pt is now s/p bronch w BAL 3/27/19  3/25 Pt denies hemoptysis currently, only coughing up sputum.  3/29/19 Pt seen, c/o much sputum with coughing, no hemoptysis    Vital Signs:  Vital Signs Last 24 Hrs  T(C): 36.9 (03-29-19 @ 04:57), Max: 37 (03-28-19 @ 22:02)  T(F): 98.4 (03-29-19 @ 04:57), Max: 98.6 (03-28-19 @ 22:02)  HR: 85 (03-29-19 @ 04:57) (76 - 94)  BP: 104/71 (03-29-19 @ 04:57) (104/71 - 109/69)  RR: 18 (03-29-19 @ 04:57) (18 - 18)  SpO2: 93% (03-29-19 @ 04:57) (93% - 96%) on (O2)    Telemetry/Alarms:    Relevant labs, radiology and Medications reviewed                        11.8   17.12 )-----------( 377      ( 28 Mar 2019 05:49 )             34.2             MEDICATIONS  (STANDING):  ALBUTerol/ipratropium for Nebulization 3 milliLiter(s) Nebulizer every 6 hours  atorvastatin 40 milliGRAM(s) Oral at bedtime  buDESOnide   0.5 milliGRAM(s) Respule 0.5 milliGRAM(s) Inhalation two times a day  methylPREDNISolone sodium succinate Injectable 40 milliGRAM(s) IV Push every 12 hours    MEDICATIONS  (PRN):  acetaminophen   Tablet .. 650 milliGRAM(s) Oral every 6 hours PRN Temp greater or equal to 38C (100.4F), Mild Pain (1 - 3)      Physical exam  Gen NAD  Neuro AAOx3   Card RRR  Pulm clear  Abd soft  Ext warm      I&O's Summary    27 Mar 2019 07:01  -  28 Mar 2019 07:00  --------------------------------------------------------  IN: 500 mL / OUT: 600 mL / NET: -100 mL    28 Mar 2019 07:01  -  29 Mar 2019 05:34  --------------------------------------------------------  IN: 360 mL / OUT: 200 mL / NET: 160 mL        Assessment  64y Male  w/ PAST MEDICAL & SURGICAL HISTORY:  HLD (hyperlipidemia)  admitted with complaints of Patient is a 64y old  Male who presents with a chief complaint of complain of coughing up blood (28 Mar 2019 16:19)  s/p  Bronchoscopy with brush biopsy of lung      PLAN  So far cultures growing staph aureus  hemoptysis improved  care as per pulm/med  no acute thoracic intervention    Discussed with Cardiothoracic Team at AM rounds.

## 2019-03-29 NOTE — PROGRESS NOTE ADULT - ASSESSMENT
· Assessment		    # Hemoptysis with recent Dx of Lung Mass and possible Lung CA (Had outpatient PET CT)  # COPD exacerbation  # Cavitary lesion on bronchoscopy s/p brushing (culture and path pending)  # Leukocytosis may be due to infection vs steroid induced  # Probably infection of the endobronchial lesion on abx now  # DVT ppx    - d/w ,  s/p bronchoscopy, Await Staph Cx sensitivities, ID eval appreciated  - cardio/thoracic Sx eval appreciated.   - continue albuterol/budesonide  - taper IV steroids to BID today, will taper to daily tomm  - MRI of the brain to rule out mets  - Abx per ID    DC home when stable

## 2019-03-29 NOTE — CONSULT NOTE ADULT - ASSESSMENT
RUL mass R/O bronchogenic carcinoma  Hemoptysis ? Endobronchial lesion  Staph +/ Pneumonia    A/P    Await pathology  Antibiotics  Review scans ( Had CT PET out patient)

## 2019-03-29 NOTE — CONSULT NOTE ADULT - ASSESSMENT
65 yo male with a PMHx of HLD on Lipitor, lung mass seen on imaging prior to admission, admitted on 3/22 for evaluation of coughing up blood; the patient is a smoker since age 18 with one pack a day; on 3/27 the patient underwent bronchoscopy and cultures are growing Staph aureus. The patient denies fever or chills but has lost weight over the past few months. Notes mild post tussive vomiting but no further blood.   1. Patient admitted with hemoptysis; s/p bronchoscopy, found to have Staph aureus in culture, possibly lung abscess versus colonization of lung mass  - follow up cultures to sensitivity  - iv hydration and supportive care   - serial cbc and monitor temperature   - reviewed prior medical records to evaluate for resistant or atypical pathogens   - pathology is pending  - will empirically start ceftriaxone  - will add vancomycin to treat resistant bacteria   - check vancomycin trough prior to fourth dose   - oncology evaluation in progress  2. other issues: hyperlipidemia  - per medicine 65 yo male with a PMHx of HLD on Lipitor, lung mass seen on imaging prior to admission, admitted on 3/22 for evaluation of coughing up blood; the patient is a smoker since age 18 with one pack a day; on 3/27 the patient underwent bronchoscopy and cultures are growing Staph aureus. The patient denies fever or chills but has lost weight over the past few months. Notes mild post tussive vomiting but no further blood.   1. Patient admitted with hemoptysis; s/p bronchoscopy, found to have Staph aureus in culture, possibly lung abscess versus colonization of lung mass; also noted with leukocytosis which may be due to infection versus malignancy versus steroid effect  - follow up cultures to sensitivity  - iv hydration and supportive care   - serial cbc and monitor temperature   - reviewed prior medical records to evaluate for resistant or atypical pathogens   - pathology is pending  - will empirically start ceftriaxone  - will add vancomycin to treat resistant bacteria   - check vancomycin trough prior to fourth dose   - oncology evaluation in progress  2. other issues: hyperlipidemia  - per medicine

## 2019-03-29 NOTE — CHART NOTE - NSCHARTNOTEFT_GEN_A_CORE
Assessment:   *Was NPO yesterday for bronchoscopy with brush biopsy of lung 2/2 hemoptysis.   *Pt continues on Regular PO diet. Pt reports no N/v/d/c. Says he wasn't getting the Ensure's that were previously recommended to him but is still open and willing to try- pt acknowledges how much wt he has lost and wants to gain back to UBW of 140-145#. Says that his diet PTA was very poor and is willing to continue consuming Ensure's at home if he likes them.   *Paul 19, no edema noted, last BM not documented.     Recommendations:  1) Provide Ensure enlive BID to optimize PO intake.  2) Encourage PO intake >75% to meet ENN.    3) monitor pt's nutritional status.  4) Weekly wts.       Diet Presciption: Diet, Regular (03-22-19 @ 20:44)      Wt Hx:  Height (cm): 167.64 (03-22-19 @ 14:26)  Weight (kg): 65.8 (03-22-19 @ 14:26)  BMI (kg/m2): 23.4 (03-22-19 @ 14:26)      Estimated Energy Needs (calories/kg):  · Weight Used for Calculation  current  65.8kg    Estimated Energy Needs (25-30 calories/kg):  · Weight  (lbs)  145 lb  · Weight (kg)  65.8 kg  · From (25cal/kg)  1645  · To (30cal/kg)  1974    Other Calculation:  · Other Calculation  ht: 66in; Wt: 65.8kg; BMI: 23.4; IBW: 64.5kg; IBW%: 102%    Estimated Protein Needs (1.2-1.4 gm/kg):  · Weight  (lbs)  145  · Weight (kg)  65.8 kg  · From (1.2 g/kg)  78.96  · To (1.4 g/kg)  92.12    Estimated Fluid Needs (25-30 ml/kg):  · Weight  (lbs)  145  · Weight (kg)  65.8  · From (25 ml/kg)  1645  · To (30 ml/kg)  1974    Estimated Needs:   [x] no change since previous assessment      Nutrition Diagnostic #1:  · Nutrition Diagnostic Terminology #1: Nutrient  · Nutrient: Malnutrition; Meets criteria for severe protein-calorie malnutrition in the context  · Etiology: Poor Po intake 2/2 with increased nutrient needs from Ca  · Signs/Symptoms: Severe muscle and fat wasting  · Nutrition Intervention: Meals and Snack; Medical Food Supplements  · Meals and Snacks: General/healthful diet  · Medical and Food Supplements: Commercial beverage; Ensure Enlive BID  · Goal/Expected Outcome: Pt will complete 75% of meal and drink supplement    Nutrition Diagnosis is [x] ongoing  [ ] resolved [ ] not applicable          Pertinent Medications: MEDICATIONS  (STANDING):  ALBUTerol/ipratropium for Nebulization 3 milliLiter(s) Nebulizer every 6 hours  atorvastatin 40 milliGRAM(s) Oral at bedtime  buDESOnide   0.5 milliGRAM(s) Respule 0.5 milliGRAM(s) Inhalation two times a day  methylPREDNISolone sodium succinate Injectable 40 milliGRAM(s) IV Push every 8 hours    MEDICATIONS  (PRN):  acetaminophen   Tablet .. 650 milliGRAM(s) Oral every 6 hours PRN Temp greater or equal to 38C (100.4F), Mild Pain (1 - 3)    Pertinent Labs: 03-26 Na135 mmol/L Glu 117 mg/dL<H> K+ 4.4 mmol/L Cr  0.82 mg/dL BUN 21 mg/dL 03-22 Alb 3.1 g/dL<L>     CAPILLARY BLOOD GLUCOSE          Skin: paul score = 19          Monitoring and Evaluation:   [x] PO intake/Nutr support infusion [ x ] Tolerance to Nutr [ x ] weights [ x ] labs[ x ] follow up per protocol  [ ] other:
Upon Nutritional Assessment by the Registered Dietitian your patient was determined to meet criteria / has evidence of the following diagnosis/diagnoses:          [ ]  Mild Protein Calorie Malnutrition        [ ]  Moderate Protein Calorie Malnutrition        [x] Severe Protein Calorie Malnutrition        [ ] Unspecified Protein Calorie Malnutrition        [ ] Underweight / BMI <19        [ ] Morbid Obesity / BMI > 40      Findings as based on:  •  Comprehensive nutrition assessment and consultation  •  Calorie counts (nutrient intake analysis)  •  Food acceptance and intake status from observations by staff  •  Follow up  •  Patient education  •  Intervention secondary to interdisciplinary rounds  •   concerns      Treatment:    The following diet has been recommended:  -Encourage Po intake   -Ensure enlive BID    PROVIDER Section:     By signing this assessment you are acknowledging and agree with the diagnosis/diagnoses assigned by the Registered Dietitian    Comments:
called by rn for patient wanting melatonin for sleep    melatonin qhs ordered

## 2019-03-29 NOTE — PROGRESS NOTE ADULT - ASSESSMENT
- hemoptysis likely secondary to lung cancer  - severe copd with exacerbation  - active smoker   - HLD    PLAN     - switch to PO prednisone in AM  - thoracic following  - s/p Bronch, follow up cytology and cultures  - presented at tumor board  - mri negative  - heme onc, ID following  - vanco, ceftriaxone

## 2019-03-29 NOTE — CONSULT NOTE ADULT - SUBJECTIVE AND OBJECTIVE BOX
Patient is a 64y old  Male who presents with a chief complaint of complain of coughing up blood (29 Mar 2019 05:33)    HPI:  63 yo male with a PMHx of HLD on Lipitor, lung mass seen on imaging prior to admission, admitted on 3/22 for evaluation of coughing up blood; the patient is a smoker since age 18 with one pack a day; on 3/27 the patient underwent bronchoscopy and cultures are growing Staph aureus. The patient denies fever or chills but has lost weight over the past few months. Notes mild post tussive vomiting but no further blood.               PMH: as above  PSH: as above  Meds: per reconciliation sheet, noted below  MEDICATIONS  (STANDING):  ALBUTerol/ipratropium for Nebulization 3 milliLiter(s) Nebulizer every 6 hours  atorvastatin 40 milliGRAM(s) Oral at bedtime  buDESOnide   0.5 milliGRAM(s) Respule 0.5 milliGRAM(s) Inhalation two times a day  cefTRIAXone Injectable. 1000 milliGRAM(s) IV Push every 24 hours  methylPREDNISolone sodium succinate Injectable 40 milliGRAM(s) IV Push every 12 hours  vancomycin  IVPB 1000 milliGRAM(s) IV Intermittent every 12 hours    MEDICATIONS  (PRN):  acetaminophen   Tablet .. 650 milliGRAM(s) Oral every 6 hours PRN Temp greater or equal to 38C (100.4F), Mild Pain (1 - 3)    Allergies    No Known Allergies    Intolerances      Social: positive smoking, no alcohol, no illegal drugs; no recent travel, no exposure to TB  FAMILY HISTORY:     no history of premature cardiovascular disease in first degree relatives  ROS: the patient denies fever, no chills, no HA, no dizziness, no sore throat, no blurry vision, no CP, no palpitations,  no abdominal pain, no diarrhea, no N/V, no dysuria, no leg pain, no claudication, no rash, no joint aches, no rectal pain or bleeding, no night sweats  All other systems reviewed and are negative    Vital Signs Last 24 Hrs  T(C): 36.9 (29 Mar 2019 04:57), Max: 37 (28 Mar 2019 22:02)  T(F): 98.4 (29 Mar 2019 04:57), Max: 98.6 (28 Mar 2019 22:02)  HR: 80 (29 Mar 2019 07:40) (76 - 94)  BP: 104/71 (29 Mar 2019 04:57) (104/71 - 109/69)  BP(mean): --  RR: 18 (29 Mar 2019 04:57) (18 - 18)  SpO2: 93% (29 Mar 2019 04:57) (93% - 96%)  Daily     Daily     PE:    Constitutional: frail looking  HEENT: NC/AT, EOMI, PERRLA, conjunctivae clear; ears and nose atraumatic; pharynx clear  Neck: supple; thyroid not palpable  Back: no tenderness  Respiratory: respiratory effort normal; scattered coarse breath sounds  Cardiovascular: S1S2 regular, no murmurs  Abdomen: soft, not tender, not distended, positive BS; no liver or spleen organomegaly  Genitourinary: no suprapubic tenderness  Musculoskeletal: no muscle tenderness, no joint swelling or tenderness  Neurological/ Psychiatric: AxOx3, judgement and insight normal;  moving all extremities  Skin: no rashes; no palpable lesions    Labs: all available labs reviewed                        11.6   19.15 )-----------( 409      ( 29 Mar 2019 06:22 )             33.5       Labs:                        11.6   19.15 )-----------( 409      ( 29 Mar 2019 06:22 )             33.5                  Cultures:       Culture - Fungal, Bronchial (collected 03-27-19 @ 10:35)  Source: .Bronchial RIGHT LUNG WASHINGS POST BRUSHING  Preliminary Report (03-28-19 @ 11:27):    Testing in progress    Culture - Acid Fast - Bronchial w/Smear (collected 03-27-19 @ 10:35)  Source: .Bronchial RIGHT LUNG WASHINGS POST BRUSHING    Culture - Bronchial (collected 03-27-19 @ 10:35)  Source: .Bronchial RIGHT LUNG WASHINGS POST BRUSHING  Gram Stain (03-28-19 @ 00:14):    Few polymorphonuclear leukocytes per low power field    No squamous epithelial cells per low power field    Few Gram Positive Cocci in Clusters per oil power field    Few Gram Negative Rods per oil power field  Preliminary Report (03-28-19 @ 19:43):    Numerous Staphylococcus aureus    Culture - Fungal, Bronchial (collected 03-27-19 @ 10:35)  Source: .Bronchial RIGHT LUNG WASHINGS PRE BRUSHINGS trap  Preliminary Report (03-29-19 @ 06:26):    Testing in progress    Culture - Acid Fast - Bronchial w/Smear (collected 03-27-19 @ 10:35)  Source: .Bronchial RIGHT LUNG WASHINGS PRE BRUSHINGS trap    Culture - Bronchial (collected 03-27-19 @ 10:35)  Source: .Bronchial RIGHT LUNG WASHINGS PRE BRUSHINGS trap  Gram Stain (03-28-19 @ 12:53):    Few polymorphonuclear leukocytes per low power field    Rare Squamous epithelial cells per low power field    Moderate Gram Positive Cocci in Clusters per oil power field  Preliminary Report (03-29-19 @ 08:06):    Numerous Staphylococcus aureus    Culture - Fungal, Bronchial (collected 03-27-19 @ 10:35)  Source: .Bronchial RIGHT LUNG WASHINGS PRE BRUSHINGS  Preliminary Report (03-28-19 @ 11:30):    Testing in progress    Culture - Acid Fast - Bronchial w/Smear (collected 03-27-19 @ 10:35)  Source: .Bronchial RIGHT LUNG WASHINGS PRE BRUSHINGS trap    Culture - Acid Fast - Bronchial w/Smear (collected 03-27-19 @ 10:35)  Source: .Bronchial RIGHT LUNG WASHINGS PRE BRUSHINGS    Culture - Bronchial (collected 03-27-19 @ 10:35)  Source: .Bronchial RIGHT LUNG WASHINGS PRE BRUSHINGS  Gram Stain (03-28-19 @ 00:12):    Few polymorphonuclear leukocytes per low power field    No squamous epithelial cells per low power field    Few Gram Positive Cocci in Clusters per oil power field  Preliminary Report (03-28-19 @ 19:47):    Numerous Staphylococcus aureus    See previous culture [id number]         < from: CT Angio Chest PE Protocol w/ IV Cont (03.22.19 @ 17:53) >    EXAM:  CTA CHEST PE PROTOCOL (W)AW IC                            PROCEDURE DATE:  03/22/2019          INTERPRETATION:  CTA CHEST PE PROTOCOL (W)AW IC    HISTORY:  Hemoptysis    TECHNIQUE: Contrast enhanced CT pulmonary angiogram was performed.   Multiplanar CT and HRCT images were reviewed. Maximum intensity   projection (MIP) images are reconstructed as per CT angiography protocol.   Images were acquired during the administration of 90 cc Omnipaque 350 IV   contrast. This study was performed using automatic exposure control   (radiation dose reduction software) to obtain a diagnostic image quality   scan with patient dose as low as reasonably achievable.    COMPARISON: None.    PULMONARY ARTERIES: No pulmonary embolism. Right upper lobe pulmonary   artery and interlobar artery are encased and narrowed by tumor.    LUNGS, AIRWAYS: Saber-sheath configuration of the trachea consistent with   COPD. Centrilobular emphysema. 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 Distal tree-in-bud   opacities in the right upper lobe.    PLEURA: No pleural abnormality.    VESSELS: Normal caliber aorta.    HEART: Normal heart size. No pericardial effusion. Coronary artery   calcifications are present.    MEDIASTINUM, ABDOULAYE, AXILLAE: Subcarinal and right hilar soft tissue   invasion.    UPPER ABDOMEN: Limited visualization is unremarkable.    BONES AND CHEST WALL: No aggressive lesion.    IMPRESSION:     No pulmonary embolism.    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 Right upper lobe pulmonary artery and interlobar artery are   encased and narrowed by tumor. Subcarinal and right hilar soft tissue   invasion.    < end of copied text >          Radiology: all available radiological tests reviewed    Advanced directives addressed: full resuscitation
HPI:  63 yo male with a history of smoking, PMHx of HLD on Lipitor, right upper  lung mass presents to the ED with sudden onset of coughing up blood since last night and today. Recently diagnosed with a lung mass after imaging by pulmonologist. Around 19:00-20:00 last night, patient reports sudden onset of  "coughing up red blood." After symptoms resolved, patient felt it was unnecessary to seek evaluation. At 14:00 today patient reports return of symptoms. Presents with mother and brother at bedside. No h/o similar symptoms. Does not take blood thinners. Denies fever. No sick contact. No recent travel. He is also having SOB for the last 2 weeks during his work. Patient has lost significant weight in the 4-5 months as per the brother at the bedside. As per the mother at the bedside he has lost his appetite around 6 months ago. He is a current smoker. Started to smoke when he turned 18 years. He used to smoke 1/2 pack-1 pack a day over all these years. He works as a  for buildings.     He underwent a bronchoscopy/ brushings; pathology pending/ Staph is + / moderate; on antibiotics and being evaluated by ID      Family Hx:  Mother: No lung cancer, No CVA  Father: No lung cancer, No CVA    PSHx:  No recent surgeries.     PMHx:  Hyperlipidemia (22 Mar 2019 20:50)      PAST MEDICAL & SURGICAL HISTORY:  HLD (hyperlipidemia)      MEDICATIONS  (STANDING):  ALBUTerol/ipratropium for Nebulization 3 milliLiter(s) Nebulizer every 6 hours  atorvastatin 40 milliGRAM(s) Oral at bedtime  buDESOnide   0.5 milliGRAM(s) Respule 0.5 milliGRAM(s) Inhalation two times a day  cefTRIAXone Injectable. 1000 milliGRAM(s) IV Push every 24 hours  methylPREDNISolone sodium succinate Injectable 40 milliGRAM(s) IV Push every 12 hours  vancomycin  IVPB 1000 milliGRAM(s) IV Intermittent every 12 hours    MEDICATIONS  (PRN):  acetaminophen   Tablet .. 650 milliGRAM(s) Oral every 6 hours PRN Temp greater or equal to 38C (100.4F), Mild Pain (1 - 3)      Allergies    No Known Allergies    Intolerances        SOCIAL HISTORY:    FAMILY HISTORY:      Vital Signs Last 24 Hrs  T(C): 36.9 (29 Mar 2019 04:57), Max: 37 (28 Mar 2019 22:02)  T(F): 98.4 (29 Mar 2019 04:57), Max: 98.6 (28 Mar 2019 22:02)  HR: 80 (29 Mar 2019 07:40) (76 - 94)  BP: 104/71 (29 Mar 2019 04:57) (104/71 - 109/69)  BP(mean): --  RR: 18 (29 Mar 2019 04:57) (18 - 18)  SpO2: 93% (29 Mar 2019 04:57) (93% - 96%)      LABS:                        11.6   19.15 )-----------( 409      ( 29 Mar 2019 06:22 )             33.5   < from: CT Angio Chest PE Protocol w/ IV Cont (03.22.19 @ 17:53) >  HISTORY:  Hemoptysis    TECHNIQUE: Contrast enhanced CT pulmonary angiogram was performed.   Multiplanar CT and HRCT images were reviewed. Maximum intensity   projection (MIP) images are reconstructed as per CT angiography protocol.   Images were acquired during the administration of 90 cc Omnipaque 350 IV   contrast. This study was performed using automatic exposure control   (radiation dose reduction software) to obtain a diagnostic image quality   scan with patient dose as low as reasonably achievable.    COMPARISON: None.    PULMONARY ARTERIES: No pulmonary embolism. Right upper lobe pulmonary   artery and interlobar artery are encased and narrowed by tumor.    LUNGS, AIRWAYS: Saber-sheath configuration of the trachea consistent with   COPD. Centrilobular emphysema. 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 Distal tree-in-bud   opacities in the right upper lobe.    PLEURA: No pleural abnormality.    VESSELS: Normal caliber aorta.    HEART: Normal heart size. No pericardial effusion. Coronary artery   calcifications are present.    MEDIASTINUM, ABDOULAYE, AXILLAE: Subcarinal and right hilar soft tissue   invasion.    UPPER ABDOMEN: Limited visualization is unremarkable.    BONES AND CHEST WALL: No aggressive lesion.    IMPRESSION:     No pulmonary embolism.    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 Right upper lobe pulmonary artery and interlobar artery are   encased and narrowed by tumor. Subcarinal and right hilar soft tissue   invasion.        < end of copied text >                RADIOLOGY & ADDITIONAL STUDIES:
History of Present Illness:  64y Male active smoker with hemoptysis x 48hrs.  CT showed rt hilar mass.  Now improved    Past Medical History      Past Surgical History      MEDICATIONS  (STANDING):  ALBUTerol/ipratropium for Nebulization 3 milliLiter(s) Nebulizer every 6 hours  atorvastatin 40 milliGRAM(s) Oral at bedtime  buDESOnide   0.5 milliGRAM(s) Respule 0.5 milliGRAM(s) Inhalation two times a day  methylPREDNISolone sodium succinate Injectable 40 milliGRAM(s) IV Push every 8 hours    MEDICATIONS  (PRN):  acetaminophen   Tablet .. 650 milliGRAM(s) Oral every 6 hours PRN Temp greater or equal to 38C (100.4F), Mild Pain (1 - 3)    Antiplatelet therapy:                           Last dose/amt:    Allergies: No Known Allergies      SOCIAL HISTORY:  Smoker: [x ] Yes  [ ] No        PACK YEARS:                         WHEN QUIT?  ETOH use: [ ] Yes  [x ] No              FREQUENCY / QUANTITY:  Ilicit Drug use:  [ ] Yes  [x ] No  Occupation:  Live with:  Assist device use:    Relevant Family History  FAMILY HISTORY:      Review of Systems - neg except fr HPI  GENERAL:  Fevers[] chills[] sweats[] fatigue[] weight loss[] weight gain []                                        NEURO:  parathesias[] seizures []  syncope []  confusion []                                                                                  EYES: glasses[]  blurry vision[]  discharge[] pain[] glaucoma []                                                                            ENMT:  difficulty hearing []  vertigo[]  dysphagia[] epistaxis[] recent dental work []                                      CV:  chest pain[] palpitations[] WOLF [] diaphoresis [] edema[]                                                                                             RESPIRATORY:  wheezing[] SOB[] cough [] sputum[] hemoptysis[]                                                                    GI:  nausea[]  vomiting []  diarrhea[] constipation [] melena []                                                                        : hematuria[ ]  dysuria[ ] urgency[] incontinence[]                                                                                              MUSKULOSKELETAL:  arthritis[ ]  joint swelling [ ] muscle weakness [ ]                                                                  SKIN/BREAST:  rash[ ] itching [ ]  hair loss[ ] masses[ ]                                                                                                PSYCH:  dementia [ ] depresion [ ] anxiety[ ]                                                                                                                  HEME/LYMPH:  bruises easily[ ] enlarged lymph nodes[ ] tender lymph nodes[ ]                                                 ENDOCRINE:  cold intolerance[ ] heat intolerance[ ] polydipsia[ ]                                                                              PHYSICAL EXAM  Vital Signs Last 24 Hrs  T(C): 36.8 (23 Mar 2019 13:49), Max: 37.3 (22 Mar 2019 23:00)  T(F): 98.2 (23 Mar 2019 13:49), Max: 99.2 (22 Mar 2019 23:00)  HR: 104 (23 Mar 2019 13:57) (79 - 109)  BP: 105/78 (23 Mar 2019 13:00) (102/58 - 115/71)  BP(mean): 84 (23 Mar 2019 13:00) (68 - 84)  RR: 23 (23 Mar 2019 13:00) (16 - 23)  SpO2: 96% (23 Mar 2019 13:00) (91% - 100%)    General: Well nourished, well developed, no acute distress.                                                         Neuro: Normal exam oriented to person/place & time with no focal motor or sensory  deficits.                    Eyes: Normal exam of conjunctiva & lids, pupils equally reactive.   ENT: Normal exam of nasal/oral mucosa with absence of cyanosis.   Neck: Normal exam of jugular veins, trachea & thyroid.   Chest: Normal lung exam with good air movement absence of wheezes, rales, or rhonchi:                                                                          CV:  Auscultation: normal [x ] S3[ ] S4[ ] Irregular [ ] Rub[ ] Clicks[ ]  Murmurs none:[x ]systolic [ ]  diastolic [ ] holosystolic [ ]  Carotids: No Bruits[ ] Other____________ Abdominal Aorta: normal [ ] nonpalpable[ ]                                                                         GI: Normal exam of abdomen, liver & spleen with no noted masses or tenderness.                                                                                              Extremities: Normal no evidence of cyanosis or deformity Edema: none[ ]trace[ ]1+[ ]2+[ ]3+[ ]4+[ ]  Lower Extremity Pulses: Right[ ] Left[ ]Varicosities[ ]  SKIN : Normal exam to inspection & palation.                                                           LABS:                        12.3   8.52  )-----------( 268      ( 23 Mar 2019 06:10 )             36.7     03-22    138  |  104  |  22  ----------------------------<  111<H>  4.1   |  26  |  0.91    Ca    8.4<L>      22 Mar 2019 16:21    TPro  7.0  /  Alb  3.1<L>  /  TBili  0.3  /  DBili  x   /  AST  30  /  ALT  28  /  AlkPhos  78  03-22    PT/INR - ( 22 Mar 2019 16:21 )   PT: 13.1 sec;   INR: 1.17 ratio         PTT - ( 22 Mar 2019 16:21 )  PTT:32.8 sec            CXR:  CT Chest: Rt hilar mass with med adenopathy        Assessment:  64y Male presents with Hemoptysis found to have mass.  If rebleeds would consider IR for embo.      Plan:
Pulmonary Consult        Reason for Admission: complain of coughing up blood	  History of Present Illness: 	  63 yo male with a PMHx of HLD on Lipitor, lung mass severe copd  presents to the ED with sudden onset of coughing up blood since last night and today. patient was followed up with Dr alcala who suggested out patient follow up with DR maldonado for the biopsy and patient could not make his visit in the next few days . He has to come to the ER with the hemoptysis that started  Around 19:00-20:00 last night, patient reports sudden onset of  "coughing up red blood." After symptoms resolved, patient felt it was unnecessary to seek evaluation. At 14:00 today patient reports return of symptoms. Presents with mother and brother at bedside. No h/o similar symptoms. patient takes ASA on daily basis.  Denies fever. No sick contact. No recent travel. He is also having SOB for the last 2 weeks during his work. Patient has lost significant weight in the 4-5 months as per the brother at the bedside. As per the mother at the bedside he has lost his appetite around 6 months ago. He is a current smoker. Started to smoke when he turned 18 years. He used to smoke 1/2 pack-1 pack a day over all these years. He has out patient pet positive scan with  no uptake in the mediastinal lymph nodes . He also refused to do the pft he did only  spirometry shows  moderate obstruction and how ever has diffuse emphysema and may be having low diffusion . He noted to have diffuse wheezing with the hemoptysis indicative copd exacerbation as well .     Family Hx:  Mother: No lung cancer, No CVA  Father: No lung cancer, No CVA    PSHx:  No recent surgeries.     PMHx:  Hyperlipidemia   severe copd on the out patient spirometry   FAMILY HISTORY:      SOCIAL HISTORY:    active smoker with more than 40 jackie history     Allergies    No Known Allergies    Intolerances        acetaminophen   Tablet .. 650 milliGRAM(s) Oral every 6 hours PRN  atorvastatin 40 milliGRAM(s) Oral at bedtime        REVIEW OF SYSTEMS:  Constitutional: No fevers or chills positive for the weight loss   Eyes: No itching or discharge from the eyes  ENT:  No post nasal drip. No epistaxis. No throat pain. No sore throat. No difficulty swallowing.   CV: No chest pain. No palpitations. No lightheadedness or dizziness.   Resp: positive for the hemoptysis and wheezing   GI: No nausea. No vomiting. No diarrhea or abdominal pain   MSK: No joint pain or pain in any extremities  Integumentary: No skin lesions. No pedal edema.  Neurological: No gross motor weakness. No sensory changes.      OBJECTIVE:  Vital Signs Last 24 Hrs  T(C): 37.2 (22 Mar 2019 18:52), Max: 37.3 (22 Mar 2019 14:26)  T(F): 99 (22 Mar 2019 18:52), Max: 99.1 (22 Mar 2019 14:26)  HR: 95 (22 Mar 2019 18:52) (95 - 100)  BP: 109/74 (22 Mar 2019 18:52) (106/76 - 110/68)  BP(mean): --  RR: 18 (22 Mar 2019 18:52) (18 - 18)  SpO2: 100% (22 Mar 2019 18:52) (94% - 100%)      PHYSICAL EXAM:  General: Awake, alert, oriented X 3.   HEENT: Atraumatic, normocephalic.   Neck: No JVD no lymphadenopathy   Respiratory: normal vesicular breathing with the bilateral wheeze and prolonged expiration   Cardiovascular: S1 S2 normal. No murmurs, rubs or gallops.   Abdomen: Soft, non-tender, non-distended. No organomegaly.  Extremities: Warm to touch. Peripheral pulse palpable. No pedal edema.   Skin: No rashes or skin lesions  Neurological: Motor and sensory examination equal and normal in all four extremities.  Psychiatry: Appropriate mood and affect.    HOSPITAL MEDICATIONS:  MEDICATIONS  (STANDING):  atorvastatin 40 milliGRAM(s) Oral at bedtime    MEDICATIONS  (PRN):  acetaminophen   Tablet .. 650 milliGRAM(s) Oral every 6 hours PRN Temp greater or equal to 38C (100.4F), Mild Pain (1 - 3)      LABS:                        12.5   8.84  )-----------( 281      ( 22 Mar 2019 16:21 )             36.6     03-22    138  |  104  |  22  ----------------------------<  111<H>  4.1   |  26  |  0.91    Ca    8.4<L>      22 Mar 2019 16:21    TPro  7.0  /  Alb  3.1<L>  /  TBili  0.3  /  DBili  x   /  AST  30  /  ALT  28  /  AlkPhos  78  03-22    PT/INR - ( 22 Mar 2019 16:21 )   PT: 13.1 sec;   INR: 1.17 ratio         PTT - ( 22 Mar 2019 16:21 )  PTT:32.8 sec        < from: CT Angio Chest PE Protocol w/ IV Cont (03.22.19 @ 17:53) >  TERPRETATION:  CTA CHEST PE PROTOCOL (W)AW IC    HISTORY:  Hemoptysis    TECHNIQUE: Contrast enhanced CT pulmonary angiogram was performed.   Multiplanar CT and HRCT images were reviewed. Maximum intensity   projection (MIP) images are reconstructed as per CT angiography protocol.   Images were acquired during the administration of 90 cc Omnipaque 350 IV   contrast. This study was performed using automatic exposure control   (radiation dose reduction software) to obtain a diagnostic image quality   scan with patient dose as low as reasonably achievable.    COMPARISON: None.    PULMONARY ARTERIES: No pulmonary embolism. Right upper lobe pulmonary   artery and interlobar artery are encased and narrowed by tumor.    LUNGS, AIRWAYS: Saber-sheath configuration of the trachea consistent with   COPD. Centrilobular emphysema. 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 Distal tree-in-bud   opacities in the right upper lobe.    PLEURA: No pleural abnormality.    VESSELS: Normal caliber aorta.    HEART: Normal heart size. No pericardial effusion. Coronary artery   calcifications are present.    MEDIASTINUM, ABDOULAYE, AXILLAE: Subcarinal and right hilar soft tissue   invasion.    UPPER ABDOMEN: Limited visualization is unremarkable.    BONES AND CHEST WALL: No aggressive lesion.    IMPRESSION:     No pulmonary embolism.    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 Right upper lobe pulmonary artery and interlobar artery are   encased and narrowed by tumor. Subcarinal and right hilar soft tissue   invasion.

## 2019-03-29 NOTE — PROGRESS NOTE ADULT - SUBJECTIVE AND OBJECTIVE BOX
Subjective:  Denies SOB  No cough  No hemoptysis  Seen by heme onc and id. Started on ceftriaxone and vancomycin    Review of Systems:  All 10 systems reviewed in detailed and found to be negative with the exception of what has already been described above    Allergies:  No Known Allergies    Meds  MEDICATIONS  (STANDING):  ALBUTerol/ipratropium for Nebulization 3 milliLiter(s) Nebulizer every 6 hours  atorvastatin 40 milliGRAM(s) Oral at bedtime  buDESOnide   0.5 milliGRAM(s) Respule 0.5 milliGRAM(s) Inhalation two times a day  cefTRIAXone Injectable. 1000 milliGRAM(s) IV Push every 24 hours  methylPREDNISolone sodium succinate Injectable 40 milliGRAM(s) IV Push every 12 hours  vancomycin  IVPB 1000 milliGRAM(s) IV Intermittent every 12 hours    MEDICATIONS  (PRN):  acetaminophen   Tablet .. 650 milliGRAM(s) Oral every 6 hours PRN Temp greater or equal to 38C (100.4F), Mild Pain (1 - 3)    Physical Exam  T(C): 36.8 (03-29-19 @ 11:44), Max: 37 (03-28-19 @ 22:02)  HR: 78 (03-29-19 @ 13:35) (78 - 101)  BP: 112/75 (03-29-19 @ 11:44) (104/71 - 112/75)  RR: 18 (03-29-19 @ 11:44) (18 - 18)  SpO2: 93% (03-29-19 @ 11:44) (93% - 96%)  Gen: Alert, oriented, no distress  HEENT: Anicteric sclera, moist mucous membranes, no JVD, no lymphadenopathy, good dentition  Cardio: Regular rhythm and rate, normal S1S2, no murmurs  Resp: Clear to auscultation bilaterally, no wheezing or rhonchi  GI: Nontender, nondistended, normoactive bowel sounds  Ext: No cyanosis, clubbing or edema  Neuro: Nonfocal      Labs:                        11.6   19.15 )-----------( 409      ( 29 Mar 2019 06:22 )             33.5       < from: CT Angio Chest PE Protocol w/ IV Cont (03.22.19 @ 17:53) >  XAM:  CTA CHEST PE PROTOCOL (W)AW IC                            PROCEDURE DATE:  03/22/2019          INTERPRETATION:  CTA CHEST PE PROTOCOL (W)AW IC    HISTORY:  Hemoptysis    TECHNIQUE: Contrast enhanced CT pulmonary angiogram was performed.   Multiplanar CT and HRCT images were reviewed. Maximum intensity   projection (MIP) images are reconstructed as per CT angiography protocol.   Images were acquired during the administration of 90 cc Omnipaque 350 IV   contrast. This study was performed using automatic exposure control   (radiation dose reduction software) to obtain a diagnostic image quality   scan with patient dose as low as reasonably achievable.    COMPARISON: None.    PULMONARY ARTERIES: No pulmonary embolism. Right upper lobe pulmonary   artery and interlobar artery are encased and narrowed by tumor.    LUNGS, AIRWAYS: Saber-sheath configuration of the trachea consistent with   COPD. Centrilobular emphysema. 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 Distal tree-in-bud   opacities in the right upper lobe.    PLEURA: No pleural abnormality.    VESSELS: Normal caliber aorta.    HEART: Normal heart size. No pericardial effusion. Coronary artery   calcifications are present.    MEDIASTINUM, ABDOULAYE, AXILLAE: Subcarinal and right hilar soft tissue   invasion.    UPPER ABDOMEN: Limited visualization is unremarkable.    BONES AND CHEST WALL: No aggressive lesion.    IMPRESSION:     No pulmonary embolism.    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 Right upper lobe pulmonary artery and interlobar artery are   encased and narrowed by tumor. Subcarinal and right hilar soft tissue   invasion.      < end of copied text >        < from: MR Head w/wo IV Cont (03.29.19 @ 12:10) >  INTERPRETATION:      MR brain with and without gadolinium      CLINICAL INFORMATION:   Lung cancer, evaluate for metastatic disease      TECHNIQUE:   Sagittal and axial T1-weighted images, axial FLAIR images,   axial T2-weighted images, axial gradient echo images and axial diffusion   weighted images of the brain were obtained. Following 6.5 cc of Gadavist   administration, 1.0 cc discarded, isotropic volumetric and fast spin echo   T1-weighted images were obtained; this data was reformatted using image   post processing software in multiple imaging planes.          FINDINGS:   No prior similar studies are available for review.         The brain demonstrates mild periventricular and scattered bifrontal and   biparietal subcortical white matter ischemia. Following gadolinium   administration no abnormal enhancement occurs.  No acute cerebral   cortical infarct is found.   No intracranial hemorrhage is recognized.    No mass effect is found in the brain.          The ventricles, sulci and basal cisterns appear unremarkable.    The vertebral and internal carotid arteries demonstrate expected flow   voids indicating their patency.         The orbits are unremarkable.  The paranasal sinuses are significant for   mucosal thickening in the LEFT sphenoid and LEFT maxillary sinuses.  The   nasal cavity appears intact.  The nasopharynx is symmetric.  The central   skull base and petrous temporal bones are intact.  The calvarium appears   unremarkable.      IMPRESSION:  No abnormal enhancement is seen that would suggest   metastatic disease.  Mild periventricular and scattered bifrontal and   biparietal subcortical white matter ischemia.            < end of copied text >      BAL Negative AFB, numerous staph aureus

## 2019-03-29 NOTE — PROGRESS NOTE ADULT - SUBJECTIVE AND OBJECTIVE BOX
HOSPITALIST ATTENDING PROGRESS NOTE    Chart and meds reviewed.  Patient seen and examined.    S: 3/25/19 pt seen and examined, says has seen  as outpatietn, was told may have malignancy, quit smoking 2 weeks ago, 44 years x 2 packs per day, less hemoptysis today. Lives with mom.     3/26/19 pt seen and examined, discussed with brother and mom    3/27/19 pt seen and examined, for bronchoscopy today.     3/28/19 pt seen and examined, taper steroids today to bid, d/w pulm.    3/29/19 pt seen and examined, d/w  and . Patient will need MRI of the brain, hemeonc evaluation with  today. Given the bronchoscopy culture is growing staph, ID marley is appreciated. 	        All 10 systems reviewed and found to be negative with the exception of what has been described above.    MEDICATIONS  (STANDING):  ALBUTerol/ipratropium for Nebulization 3 milliLiter(s) Nebulizer every 6 hours  atorvastatin 40 milliGRAM(s) Oral at bedtime  buDESOnide   0.5 milliGRAM(s) Respule 0.5 milliGRAM(s) Inhalation two times a day  cefTRIAXone Injectable. 1000 milliGRAM(s) IV Push every 24 hours  methylPREDNISolone sodium succinate Injectable 40 milliGRAM(s) IV Push every 12 hours  vancomycin  IVPB 1000 milliGRAM(s) IV Intermittent every 12 hours    MEDICATIONS  (PRN):  acetaminophen   Tablet .. 650 milliGRAM(s) Oral every 6 hours PRN Temp greater or equal to 38C (100.4F), Mild Pain (1 - 3)      VITALS:  T(F): 98.3 (03-29-19 @ 11:44), Max: 98.6 (03-28-19 @ 22:02)  HR: 101 (03-29-19 @ 11:44) (76 - 101)  BP: 112/75 (03-29-19 @ 11:44) (104/71 - 112/75)  RR: 18 (03-29-19 @ 11:44) (18 - 18)  SpO2: 93% (03-29-19 @ 11:44) (93% - 96%)  Wt(kg): --    I&O's Summary    28 Mar 2019 07:01  -  29 Mar 2019 07:00  --------------------------------------------------------  IN: 360 mL / OUT: 200 mL / NET: 160 mL    29 Mar 2019 07:01  -  29 Mar 2019 13:52  --------------------------------------------------------  IN: 240 mL / OUT: 0 mL / NET: 240 mL        CAPILLARY BLOOD GLUCOSE          PHYSICAL EXAM:    HEENT:  pupils equal and reactive, EOMI, no oropharyngeal lesions, erythema, exudates, oral thrush  NECK:   supple, no carotid bruits, no palpable lymph nodes, no thyromegaly  CV:  +S1, +S2, regular, no murmurs or rubs  RESP:   lungs clear to auscultation bilaterally, no wheezing, rales, rhonchi, good air entry bilaterally  BREAST:  not examined  GI:  abdomen soft, non-tender, non-distended, normal BS, no bruits, no abdominal masses, no palpable masses  RECTAL:  not examined  :  not examined  MSK:   normal muscle tone, no atrophy, no rigidity, no contractions  EXT:  no clubbing, no cyanosis, no edema, no calf pain, swelling or erythema  VASCULAR:  pulses equal and symmetric in the upper and lower extremities  NEURO:  AAOX3, no focal neurological deficits, follows all commands, able to move extremities spontaneously  SKIN:  no ulcers, lesions or rashes    LABS:                            11.6   19.15 )-----------( 409      ( 29 Mar 2019 06:22 )             33.5                                                   CULTURES:

## 2019-03-30 LAB
-  AMPICILLIN/SULBACTAM: SIGNIFICANT CHANGE UP
-  CEFAZOLIN: SIGNIFICANT CHANGE UP
-  CLINDAMYCIN: SIGNIFICANT CHANGE UP
-  ERYTHROMYCIN: SIGNIFICANT CHANGE UP
-  GENTAMICIN: SIGNIFICANT CHANGE UP
-  OXACILLIN: SIGNIFICANT CHANGE UP
-  PENICILLIN: SIGNIFICANT CHANGE UP
-  RIFAMPIN: SIGNIFICANT CHANGE UP
-  TETRACYCLINE: SIGNIFICANT CHANGE UP
-  TRIMETHOPRIM/SULFAMETHOXAZOLE: SIGNIFICANT CHANGE UP
-  VANCOMYCIN: SIGNIFICANT CHANGE UP
ALBUMIN SERPL ELPH-MCNC: 2.2 G/DL — LOW (ref 3.3–5)
ALP SERPL-CCNC: 72 U/L — SIGNIFICANT CHANGE UP (ref 40–120)
ALT FLD-CCNC: 41 U/L — SIGNIFICANT CHANGE UP (ref 12–78)
ANION GAP SERPL CALC-SCNC: 8 MMOL/L — SIGNIFICANT CHANGE UP (ref 5–17)
AST SERPL-CCNC: 15 U/L — SIGNIFICANT CHANGE UP (ref 15–37)
BILIRUB SERPL-MCNC: 0.3 MG/DL — SIGNIFICANT CHANGE UP (ref 0.2–1.2)
BUN SERPL-MCNC: 24 MG/DL — HIGH (ref 7–23)
CALCIUM SERPL-MCNC: 8.3 MG/DL — LOW (ref 8.5–10.1)
CHLORIDE SERPL-SCNC: 96 MMOL/L — SIGNIFICANT CHANGE UP (ref 96–108)
CO2 SERPL-SCNC: 28 MMOL/L — SIGNIFICANT CHANGE UP (ref 22–31)
CREAT SERPL-MCNC: 0.8 MG/DL — SIGNIFICANT CHANGE UP (ref 0.5–1.3)
CULTURE RESULTS: SIGNIFICANT CHANGE UP
GLUCOSE SERPL-MCNC: 137 MG/DL — HIGH (ref 70–99)
HCT VFR BLD CALC: 34.5 % — LOW (ref 39–50)
HGB BLD-MCNC: 11.7 G/DL — LOW (ref 13–17)
MCHC RBC-ENTMCNC: 29.6 PG — SIGNIFICANT CHANGE UP (ref 27–34)
MCHC RBC-ENTMCNC: 33.9 GM/DL — SIGNIFICANT CHANGE UP (ref 32–36)
MCV RBC AUTO: 87.3 FL — SIGNIFICANT CHANGE UP (ref 80–100)
METHOD TYPE: SIGNIFICANT CHANGE UP
NRBC # BLD: 0 /100 WBCS — SIGNIFICANT CHANGE UP (ref 0–0)
ORGANISM # SPEC MICROSCOPIC CNT: SIGNIFICANT CHANGE UP
ORGANISM # SPEC MICROSCOPIC CNT: SIGNIFICANT CHANGE UP
PLATELET # BLD AUTO: 417 K/UL — HIGH (ref 150–400)
POTASSIUM SERPL-MCNC: 4.2 MMOL/L — SIGNIFICANT CHANGE UP (ref 3.5–5.3)
POTASSIUM SERPL-SCNC: 4.2 MMOL/L — SIGNIFICANT CHANGE UP (ref 3.5–5.3)
PROT SERPL-MCNC: 6.3 GM/DL — SIGNIFICANT CHANGE UP (ref 6–8.3)
RBC # BLD: 3.95 M/UL — LOW (ref 4.2–5.8)
RBC # FLD: 13.1 % — SIGNIFICANT CHANGE UP (ref 10.3–14.5)
SODIUM SERPL-SCNC: 132 MMOL/L — LOW (ref 135–145)
SPECIMEN SOURCE: SIGNIFICANT CHANGE UP
WBC # BLD: 21.14 K/UL — HIGH (ref 3.8–10.5)
WBC # FLD AUTO: 21.14 K/UL — HIGH (ref 3.8–10.5)

## 2019-03-30 RX ORDER — ONDANSETRON 8 MG/1
4 TABLET, FILM COATED ORAL EVERY 6 HOURS
Qty: 0 | Refills: 0 | Status: DISCONTINUED | OUTPATIENT
Start: 2019-03-30 | End: 2019-04-01

## 2019-03-30 RX ADMIN — Medication 3 MILLILITER(S): at 07:58

## 2019-03-30 RX ADMIN — Medication 250 MILLIGRAM(S): at 18:34

## 2019-03-30 RX ADMIN — Medication 0.5 MILLIGRAM(S): at 07:59

## 2019-03-30 RX ADMIN — Medication 250 MILLIGRAM(S): at 05:10

## 2019-03-30 RX ADMIN — Medication 3 MILLIGRAM(S): at 21:16

## 2019-03-30 RX ADMIN — ONDANSETRON 4 MILLIGRAM(S): 8 TABLET, FILM COATED ORAL at 18:28

## 2019-03-30 RX ADMIN — Medication 40 MILLIGRAM(S): at 05:10

## 2019-03-30 RX ADMIN — Medication 0.5 MILLIGRAM(S): at 20:11

## 2019-03-30 RX ADMIN — Medication 3 MILLILITER(S): at 20:10

## 2019-03-30 RX ADMIN — Medication 3 MILLILITER(S): at 14:47

## 2019-03-30 RX ADMIN — Medication 3 MILLILITER(S): at 01:19

## 2019-03-30 RX ADMIN — ATORVASTATIN CALCIUM 40 MILLIGRAM(S): 80 TABLET, FILM COATED ORAL at 21:17

## 2019-03-30 RX ADMIN — Medication 650 MILLIGRAM(S): at 21:17

## 2019-03-30 RX ADMIN — CEFTRIAXONE 1000 MILLIGRAM(S): 500 INJECTION, POWDER, FOR SOLUTION INTRAMUSCULAR; INTRAVENOUS at 18:34

## 2019-03-30 NOTE — PROGRESS NOTE ADULT - SUBJECTIVE AND OBJECTIVE BOX
Date of service: 03-30-19 @ 08:23    Lying in bed in NAD  No SOB at rest  Has cough  No side effects noted    ROS: denies dizziness, no HA, no abdominal pain, no diarrhea or constipation; no dysuria, no legs pain, no new rashes    MEDICATIONS  (STANDING):  ALBUTerol/ipratropium for Nebulization 3 milliLiter(s) Nebulizer every 6 hours  atorvastatin 40 milliGRAM(s) Oral at bedtime  buDESOnide   0.5 milliGRAM(s) Respule 0.5 milliGRAM(s) Inhalation two times a day  cefTRIAXone Injectable. 1000 milliGRAM(s) IV Push every 24 hours  melatonin 3 milliGRAM(s) Oral at bedtime  methylPREDNISolone sodium succinate Injectable 40 milliGRAM(s) IV Push every 12 hours  vancomycin  IVPB 1000 milliGRAM(s) IV Intermittent every 12 hours      Vital Signs Last 24 Hrs  T(C): 37.4 (30 Mar 2019 04:14), Max: 37.4 (29 Mar 2019 21:30)  T(F): 99.3 (30 Mar 2019 04:14), Max: 99.3 (29 Mar 2019 21:30)  HR: 96 (30 Mar 2019 04:14) (75 - 108)  BP: 92/63 (30 Mar 2019 04:14) (92/63 - 113/86)  BP(mean): --  RR: 17 (30 Mar 2019 04:14) (17 - 18)  SpO2: 94% (30 Mar 2019 04:14) (93% - 95%)    Physical Exam:      Constitutional: frail looking  HEENT: NC/AT, EOMI, PERRLA, conjunctivae clear  Neck: supple; thyroid not palpable  Back: no tenderness  Respiratory: respiratory effort normal; scattered coarse breath sounds  Cardiovascular: S1S2 regular, no murmurs  Abdomen: soft, not tender, not distended, positive BS  Genitourinary: no suprapubic tenderness  Musculoskeletal: no muscle tenderness, no joint swelling or tenderness  Neurological/ Psychiatric: AxOx3, moving all extremities  Skin: no rashes; no palpable lesions    Labs: reviewed                        11.7 21.14 )-----------( 417      ( 30 Mar 2019 05:37 )             34.5     03-30    132<L>  |  96  |  24<H>  ----------------------------<  137<H>  4.2   |  28  |  0.80    Ca    8.3<L>      30 Mar 2019 05:37    TPro  6.3  /  Alb  2.2<L>  /  TBili  0.3  /  DBili  x   /  AST  15  /  ALT  41  /  AlkPhos  72  03-30                        11.6   19.15 )-----------( 409      ( 29 Mar 2019 06:22 )             33.5       Cultures:       Culture - Fungal, Bronchial (collected 03-27-19 @ 10:35)  Source: .Bronchial RIGHT LUNG WASHINGS POST BRUSHING  Preliminary Report (03-28-19 @ 11:27):    Testing in progress    Culture - Acid Fast - Bronchial w/Smear (collected 03-27-19 @ 10:35)  Source: .Bronchial RIGHT LUNG WASHINGS POST BRUSHING    Culture - Bronchial (collected 03-27-19 @ 10:35)  Source: .Bronchial RIGHT LUNG WASHINGS POST BRUSHING  Gram Stain (03-28-19 @ 00:14):    Few polymorphonuclear leukocytes per low power field    No squamous epithelial cells per low power field    Few Gram Positive Cocci in Clusters per oil power field    Few Gram Negative Rods per oil power field  Preliminary Report (03-28-19 @ 19:43):    Numerous Staphylococcus aureus    Culture - Fungal, Bronchial (collected 03-27-19 @ 10:35)  Source: .Bronchial RIGHT LUNG WASHINGS PRE BRUSHINGS trap  Preliminary Report (03-29-19 @ 06:26):    Testing in progress    Culture - Acid Fast - Bronchial w/Smear (collected 03-27-19 @ 10:35)  Source: .Bronchial RIGHT LUNG WASHINGS PRE BRUSHINGS trap    Culture - Bronchial (collected 03-27-19 @ 10:35)  Source: .Bronchial RIGHT LUNG WASHINGS PRE BRUSHINGS trap  Gram Stain (03-28-19 @ 12:53):    Few polymorphonuclear leukocytes per low power field    Rare Squamous epithelial cells per low power field    Moderate Gram Positive Cocci in Clusters per oil power field  Preliminary Report (03-29-19 @ 08:06):    Numerous Staphylococcus aureus    Culture - Fungal, Bronchial (collected 03-27-19 @ 10:35)  Source: .Bronchial RIGHT LUNG WASHINGS PRE BRUSHINGS  Preliminary Report (03-28-19 @ 11:30):    Testing in progress    Culture - Acid Fast - Bronchial w/Smear (collected 03-27-19 @ 10:35)  Source: .Bronchial RIGHT LUNG WASHINGS PRE BRUSHINGS trap    Culture - Acid Fast - Bronchial w/Smear (collected 03-27-19 @ 10:35)  Source: .Bronchial RIGHT LUNG WASHINGS PRE BRUSHINGS    Culture - Bronchial (collected 03-27-19 @ 10:35)  Source: .Bronchial RIGHT LUNG WASHINGS PRE BRUSHINGS  Gram Stain (03-28-19 @ 00:12):    Few polymorphonuclear leukocytes per low power field    No squamous epithelial cells per low power field    Few Gram Positive Cocci in Clusters per oil power field  Preliminary Report (03-28-19 @ 19:47):    Numerous Staphylococcus aureus    See previous culture [id number]         < from: CT Angio Chest PE Protocol w/ IV Cont (03.22.19 @ 17:53) >    EXAM:  CTA CHEST PE PROTOCOL (W)AW IC                            PROCEDURE DATE:  03/22/2019          INTERPRETATION:  CTA CHEST PE PROTOCOL (W)AW IC    HISTORY:  Hemoptysis    TECHNIQUE: Contrast enhanced CT pulmonary angiogram was performed.   Multiplanar CT and HRCT images were reviewed. Maximum intensity   projection (MIP) images are reconstructed as per CT angiography protocol.   Images were acquired during the administration of 90 cc Omnipaque 350 IV   contrast. This study was performed using automatic exposure control   (radiation dose reduction software) to obtain a diagnostic image quality   scan with patient dose as low as reasonably achievable.    COMPARISON: None.    PULMONARY ARTERIES: No pulmonary embolism. Right upper lobe pulmonary   artery and interlobar artery are encased and narrowed by tumor.    LUNGS, AIRWAYS: Saber-sheath configuration of the trachea consistent with   COPD. Centrilobular emphysema. 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 Distal tree-in-bud   opacities in the right upper lobe.    PLEURA: No pleural abnormality.    VESSELS: Normal caliber aorta.    HEART: Normal heart size. No pericardial effusion. Coronary artery   calcifications are present.    MEDIASTINUM, ABDOULAYE, AXILLAE: Subcarinal and right hilar soft tissue   invasion.    UPPER ABDOMEN: Limited visualization is unremarkable.    BONES AND CHEST WALL: No aggressive lesion.    IMPRESSION:     No pulmonary embolism.    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 Right upper lobe pulmonary artery and interlobar artery are   encased and narrowed by tumor. Subcarinal and right hilar soft tissue   invasion.    < end of copied text >          Radiology: all available radiological tests reviewed    Advanced directives addressed: full resuscitation

## 2019-03-30 NOTE — PROGRESS NOTE ADULT - SUBJECTIVE AND OBJECTIVE BOX
HOSPITALIST ATTENDING PROGRESS NOTE    Chart and meds reviewed.  Patient seen and examined.    S: 3/25/19 pt seen and examined, says has seen  as outpatietn, was told may have malignancy, quit smoking 2 weeks ago, 44 years x 2 packs per day, less hemoptysis today. Lives with mom.     3/26/19 pt seen and examined, discussed with brother and mom    3/27/19 pt seen and examined, for bronchoscopy today.     3/28/19 pt seen and examined, taper steroids today to bid, d/w pulm.    3/29/19 pt seen and examined, d/w  and . Patient will need MRI of the brain, hemeonc evaluation with  today. Given the bronchoscopy culture is growing staph, ID marley is appreciated.     3/30/19 pt seen and examined, on IV abx, taper steroids	    All 10 systems reviewed and found to be negative with the exception of what has been described above.    MEDICATIONS  (STANDING):  ALBUTerol/ipratropium for Nebulization 3 milliLiter(s) Nebulizer every 6 hours  atorvastatin 40 milliGRAM(s) Oral at bedtime  buDESOnide   0.5 milliGRAM(s) Respule 0.5 milliGRAM(s) Inhalation two times a day  cefTRIAXone Injectable. 1000 milliGRAM(s) IV Push every 24 hours  melatonin 3 milliGRAM(s) Oral at bedtime  methylPREDNISolone sodium succinate Injectable 40 milliGRAM(s) IV Push daily  vancomycin  IVPB 1000 milliGRAM(s) IV Intermittent every 12 hours    MEDICATIONS  (PRN):  acetaminophen   Tablet .. 650 milliGRAM(s) Oral every 6 hours PRN Temp greater or equal to 38C (100.4F), Mild Pain (1 - 3)      VITALS:  T(F): 99 (03-30-19 @ 12:21), Max: 99.3 (03-29-19 @ 21:30)  HR: 92 (03-30-19 @ 12:21) (75 - 108)  BP: 96/51 (03-30-19 @ 12:21) (92/63 - 113/86)  RR: 17 (03-30-19 @ 12:21) (17 - 17)  SpO2: 95% (03-30-19 @ 12:21) (94% - 95%)  Wt(kg): --    I&O's Summary    29 Mar 2019 07:01  -  30 Mar 2019 07:00  --------------------------------------------------------  IN: 240 mL / OUT: 0 mL / NET: 240 mL        CAPILLARY BLOOD GLUCOSE          PHYSICAL EXAM:    HEENT:  pupils equal and reactive, EOMI, no oropharyngeal lesions, erythema, exudates, oral thrush  NECK:   supple, no carotid bruits, no palpable lymph nodes, no thyromegaly  CV:  +S1, +S2, regular, no murmurs or rubs  RESP:   lungs clear to auscultation bilaterally, no wheezing, rales, rhonchi, good air entry bilaterally  BREAST:  not examined  GI:  abdomen soft, non-tender, non-distended, normal BS, no bruits, no abdominal masses, no palpable masses  RECTAL:  not examined  :  not examined  MSK:   normal muscle tone, no atrophy, no rigidity, no contractions  EXT:  no clubbing, no cyanosis, no edema, no calf pain, swelling or erythema  VASCULAR:  pulses equal and symmetric in the upper and lower extremities  NEURO:  AAOX3, no focal neurological deficits, follows all commands, able to move extremities spontaneously  SKIN:  no ulcers, lesions or rashes    LABS:                            11.7   21.14 )-----------( 417      ( 30 Mar 2019 05:37 )             34.5     03-30    132<L>  |  96  |  24<H>  ----------------------------<  137<H>  4.2   |  28  |  0.80    Ca    8.3<L>      30 Mar 2019 05:37    TPro  6.3  /  Alb  2.2<L>  /  TBili  0.3  /  DBili  x   /  AST  15  /  ALT  41  /  AlkPhos  72  03-30        LIVER FUNCTIONS - ( 30 Mar 2019 05:37 )  Alb: 2.2 g/dL / Pro: 6.3 gm/dL / ALK PHOS: 72 U/L / ALT: 41 U/L / AST: 15 U/L / GGT: x                                             CULTURES:

## 2019-03-30 NOTE — PROGRESS NOTE ADULT - ASSESSMENT
63 yo male with a PMHx of HLD on Lipitor, lung mass seen on imaging prior to admission, admitted on 3/22 for evaluation of coughing up blood; the patient is a smoker since age 18 with one pack a day; on 3/27 the patient underwent bronchoscopy and cultures are growing Staph aureus. The patient denies fever or chills but has lost weight over the past few months. Notes mild post tussive vomiting but no further blood.     1. Lung mass ?malignancy ?infection. Hemoptysis; s/p bronchoscopy, found to have Staph aureus in culture, possibly lung abscess versus colonization of lung mass   -leukocytosis which may be due to infection versus malignancy versus steroid effect  -on vancomycin IV # 2 and ceftriaxone # 2  -tolerating abx well so far; no side effects noted  -obtain vancomycin level  - follow up cultures to sensitivity  - iv hydration and supportive care   - serial cbc and monitor temperature   - pathology is pending  -continue abx coverage   - oncology evaluation in progress  2. other issues: hyperlipidemia  - per medicine

## 2019-03-30 NOTE — PROGRESS NOTE ADULT - SUBJECTIVE AND OBJECTIVE BOX
Subjective:  Doing well  No hemoptysis, no SOB    Review of Systems:  All 10 systems reviewed in detailed and found to be negative with the exception of what has already been described above    Allergies:  No Known Allergies    Meds  MEDICATIONS  (STANDING):  ALBUTerol/ipratropium for Nebulization 3 milliLiter(s) Nebulizer every 6 hours  atorvastatin 40 milliGRAM(s) Oral at bedtime  buDESOnide   0.5 milliGRAM(s) Respule 0.5 milliGRAM(s) Inhalation two times a day  cefTRIAXone Injectable. 1000 milliGRAM(s) IV Push every 24 hours  melatonin 3 milliGRAM(s) Oral at bedtime  methylPREDNISolone sodium succinate Injectable 40 milliGRAM(s) IV Push daily  vancomycin  IVPB 1000 milliGRAM(s) IV Intermittent every 12 hours    MEDICATIONS  (PRN):  acetaminophen   Tablet .. 650 milliGRAM(s) Oral every 6 hours PRN Temp greater or equal to 38C (100.4F), Mild Pain (1 - 3)    Physical Exam  T(C): 37.4 (03-30-19 @ 04:14), Max: 37.4 (03-29-19 @ 21:30)  HR: 96 (03-30-19 @ 04:14) (75 - 108)  BP: 92/63 (03-30-19 @ 04:14) (92/63 - 113/86)  RR: 17 (03-30-19 @ 04:14) (17 - 18)  SpO2: 94% (03-30-19 @ 04:14) (93% - 95%)  Gen: Alert, oriented, no distress  HEENT: Anicteric sclera, moist mucous membranes, no JVD, no lymphadenopathy, good dentition  Cardio: Regular rhythm and rate, normal S1S2, no murmurs  Resp: Clear to auscultation bilaterally, no wheezing or rhonchi  GI: Nontender, nondistended, normoactive bowel sounds  Ext: No cyanosis, clubbing or edema  Neuro: Nonfocal      Labs:                        11.7   21.14 )-----------( 417      ( 30 Mar 2019 05:37 )             34.5     03-30    132<L>  |  96  |  24<H>  ----------------------------<  137<H>  4.2   |  28  |  0.80    Ca    8.3<L>      30 Mar 2019 05:37    TPro  6.3  /  Alb  2.2<L>  /  TBili  0.3  /  DBili  x   /  AST  15  /  ALT  41  /  AlkPhos  72  03-30      PROCEDURE DATE:  03/22/2019          INTERPRETATION:  CTA CHEST PE PROTOCOL (W)AW IC    HISTORY:  Hemoptysis    TECHNIQUE: Contrast enhanced CT pulmonary angiogram was performed.   Multiplanar CT and HRCT images were reviewed. Maximum intensity   projection (MIP) images are reconstructed as per CT angiography protocol.   Images were acquired during the administration of 90 cc Omnipaque 350 IV   contrast. This study was performed using automatic exposure control   (radiation dose reduction software) to obtain a diagnostic image quality   scan with patient dose as low as reasonably achievable.    COMPARISON: None.    PULMONARY ARTERIES: No pulmonary embolism. Right upper lobe pulmonary   artery and interlobar artery are encased and narrowed by tumor.    LUNGS, AIRWAYS: Saber-sheath configuration of the trachea consistent with   COPD. Centrilobular emphysema. 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 Distal tree-in-bud   opacities in the right upper lobe.    PLEURA: No pleural abnormality.    VESSELS: Normal caliber aorta.    HEART: Normal heart size. No pericardial effusion. Coronary artery   calcifications are present.    MEDIASTINUM, ABDOULAYE, AXILLAE: Subcarinal and right hilar soft tissue   invasion.    UPPER ABDOMEN: Limited visualization is unremarkable.    BONES AND CHEST WALL: No aggressive lesion.    IMPRESSION:     No pulmonary embolism.    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 Right upper lobe pulmonary artery and interlobar artery are   encased and narrowed by tumor. Subcarinal and right hilar soft tissue   invasion.      < end of copied text >        < from: MR Head w/wo IV Cont (03.29.19 @ 12:10) >  INTERPRETATION:      MR brain with and without gadolinium      CLINICAL INFORMATION:   Lung cancer, evaluate for metastatic disease      TECHNIQUE:   Sagittal and axial T1-weighted images, axial FLAIR images,   axial T2-weighted images, axial gradient echo images and axial diffusion   weighted images of the brain were obtained. Following 6.5 cc of Gadavist   administration, 1.0 cc discarded, isotropic volumetric and fast spin echo   T1-weighted images were obtained; this data was reformatted using image   post processing software in multiple imaging planes.          FINDINGS:   No prior similar studies are available for review.         The brain demonstrates mild periventricular and scattered bifrontal and   biparietal subcortical white matter ischemia. Following gadolinium   administration no abnormal enhancement occurs.  No acute cerebral   cortical infarct is found.   No intracranial hemorrhage is recognized.    No mass effect is found in the brain.          The ventricles, sulci and basal cisterns appear unremarkable.    The vertebral and internal carotid arteries demonstrate expected flow   voids indicating their patency.         The orbits are unremarkable.  The paranasal sinuses are significant for   mucosal thickening in the LEFT sphenoid and LEFT maxillary sinuses.  The   nasal cavity appears intact.  The nasopharynx is symmetric.  The central   skull base and petrous temporal bones are intact.  The calvarium appears   unremarkable.      IMPRESSION:  No abnormal enhancement is seen that would suggest   metastatic disease.  Mild periventricular and scattered bifrontal and   biparietal subcortical white matter ischemia.            < end of copied text >      BAL Negative AFB, numerous staph aureus

## 2019-03-30 NOTE — PROGRESS NOTE ADULT - ASSESSMENT
- hemoptysis likely secondary to lung cancer  - severe copd with exacerbation  - active smoker   - HLD    PLAN     - PO prednisone  - thoracic following  - s/p Bronch, follow up cytology and cultures  - presented at tumor board  - mri negative  - heme onc, ID following  - vanco, ceftriaxone

## 2019-03-31 ENCOUNTER — TRANSCRIPTION ENCOUNTER (OUTPATIENT)
Age: 64
End: 2019-03-31

## 2019-03-31 LAB — VANCOMYCIN TROUGH SERPL-MCNC: 9.1 UG/ML — LOW (ref 10–20)

## 2019-03-31 RX ADMIN — Medication 250 MILLIGRAM(S): at 05:39

## 2019-03-31 RX ADMIN — Medication 3 MILLIGRAM(S): at 21:04

## 2019-03-31 RX ADMIN — Medication 650 MILLIGRAM(S): at 22:01

## 2019-03-31 RX ADMIN — Medication 0.5 MILLIGRAM(S): at 08:58

## 2019-03-31 RX ADMIN — Medication 3 MILLILITER(S): at 08:57

## 2019-03-31 RX ADMIN — Medication 3 MILLILITER(S): at 20:04

## 2019-03-31 RX ADMIN — Medication 650 MILLIGRAM(S): at 00:18

## 2019-03-31 RX ADMIN — ATORVASTATIN CALCIUM 40 MILLIGRAM(S): 80 TABLET, FILM COATED ORAL at 21:05

## 2019-03-31 RX ADMIN — Medication 0.5 MILLIGRAM(S): at 20:05

## 2019-03-31 RX ADMIN — CEFTRIAXONE 1000 MILLIGRAM(S): 500 INJECTION, POWDER, FOR SOLUTION INTRAMUSCULAR; INTRAVENOUS at 17:11

## 2019-03-31 RX ADMIN — Medication 40 MILLIGRAM(S): at 05:39

## 2019-03-31 NOTE — DISCHARGE NOTE PROVIDER - CARE PROVIDER_API CALL
Canelo Scott)  Internal Medicine; Pulmonary Disease  [address] [place] [address]  Phone: [phone]  Fax: [phone]  Follow Up Time:     Vianca Gonzales)  Hematology; Internal Medicine; Medical Oncology  [address] [place] [address], [address] [place] [address]  Phone: [phone]  Fax: [phone]  Follow Up Time:     David Roldan)  Thoracic Surgery  [address] [place] [address], [address] [place] [address]  Phone: [phone]  Fax: [phone]  Follow Up Time:     Gianluca Duarte)  Infectious Disease; Internal Medicine  [address] [facility], [address], [place] [address]  Phone: [phone]  Fax: [phone]  Follow Up Time:

## 2019-03-31 NOTE — DISCHARGE NOTE PROVIDER - NSDCCPCAREPLAN_GEN_ALL_CORE_FT
PRINCIPAL DISCHARGE DIAGNOSIS  Diagnosis: Hemoptysis  Assessment and Plan of Treatment:       SECONDARY DISCHARGE DIAGNOSES  Diagnosis: Lung mass  Assessment and Plan of Treatment:

## 2019-03-31 NOTE — PROGRESS NOTE ADULT - SUBJECTIVE AND OBJECTIVE BOX
Date of service: 03-31-19 @ 08:47    Lying in bed in NAD  Has dry cough  Has low grade fever    ROS: denies dizziness, no HA,  no abdominal pain, no diarrhea or constipation; no dysuria, no legs pain, no rashes    MEDICATIONS  (STANDING):  ALBUTerol/ipratropium for Nebulization 3 milliLiter(s) Nebulizer every 6 hours  atorvastatin 40 milliGRAM(s) Oral at bedtime  buDESOnide   0.5 milliGRAM(s) Respule 0.5 milliGRAM(s) Inhalation two times a day  cefTRIAXone Injectable. 1000 milliGRAM(s) IV Push every 24 hours  melatonin 3 milliGRAM(s) Oral at bedtime  predniSONE   Tablet 40 milliGRAM(s) Oral daily      Vital Signs Last 24 Hrs  T(C): 37.6 (31 Mar 2019 04:49), Max: 38.1 (30 Mar 2019 20:50)  T(F): 99.7 (31 Mar 2019 04:49), Max: 100.6 (30 Mar 2019 20:50)  HR: 100 (31 Mar 2019 04:49) (76 - 100)  BP: 95/64 (31 Mar 2019 05:44) (90/52 - 103/67)  BP(mean): --  RR: 18 (31 Mar 2019 04:49) (17 - 18)  SpO2: 92% (31 Mar 2019 04:49) (92% - 95%)    Physical Exam:      Constitutional: frail looking  HEENT: NC/AT, EOMI, PERRLA, conjunctivae clear  Neck: supple; thyroid not palpable  Back: no tenderness  Respiratory: respiratory effort normal; scattered coarse breath sounds  Cardiovascular: S1S2 regular, no murmurs  Abdomen: soft, not tender, not distended, positive BS  Genitourinary: no suprapubic tenderness  Musculoskeletal: no muscle tenderness, no joint swelling or tenderness  Neurological/ Psychiatric: AxOx3, moving all extremities  Skin: no rashes; no palpable lesions    Labs: reviewed                        11.7   21.14 )-----------( 417      ( 30 Mar 2019 05:37 )             34.5     03-30    132<L>  |  96  |  24<H>  ----------------------------<  137<H>  4.2   |  28  |  0.80    Ca    8.3<L>      30 Mar 2019 05:37    TPro  6.3  /  Alb  2.2<L>  /  TBili  0.3  /  DBili  x   /  AST  15  /  ALT  41  /  AlkPhos  72  03-30      Vancomycin Level, Trough: 9.1 ug/mL (03-31 @ 05:18)                        11.6   19.15 )-----------( 409      ( 29 Mar 2019 06:22 )             33.5       Cultures:       Culture - Fungal, Bronchial (collected 03-27-19 @ 10:35)  Source: .Bronchial RIGHT LUNG WASHINGS POST BRUSHING  Preliminary Report (03-28-19 @ 11:27):    Testing in progress    Culture - Acid Fast - Bronchial w/Smear (collected 03-27-19 @ 10:35)  Source: .Bronchial RIGHT LUNG WASHINGS POST BRUSHING    Culture - Bronchial (collected 03-27-19 @ 10:35)  Source: .Bronchial RIGHT LUNG WASHINGS POST BRUSHING  Gram Stain (03-28-19 @ 00:14):    Few polymorphonuclear leukocytes per low power field    No squamous epithelial cells per low power field    Few Gram Positive Cocci in Clusters per oil power field    Few Gram Negative Rods per oil power field  Preliminary Report (03-28-19 @ 19:43):    Numerous Staphylococcus aureus    Culture - Fungal, Bronchial (collected 03-27-19 @ 10:35)  Source: .Bronchial RIGHT LUNG WASHINGS PRE BRUSHINGS  Preliminary Report (03-28-19 @ 11:30):    Testing in progress    Culture - Acid Fast - Bronchial w/Smear (collected 03-27-19 @ 10:35)  Source: .Bronchial RIGHT LUNG WASHINGS PRE BRUSHINGS trap    Culture - Acid Fast - Bronchial w/Smear (collected 03-27-19 @ 10:35)  Source: .Bronchial RIGHT LUNG WASHINGS PRE BRUSHINGS    Culture - Bronchial (03.27.19 @ 10:35)    -  Tetra/Doxy: S <=1    -  Trimethoprim/Sulfamethoxazole: S <=0.5/9.5    -  Vancomycin: S 2    -  Ampicillin/Sulbactam: S <=8/4    -  Cefazolin: S <=4    -  Clindamycin: S <=0.25    -  Erythromycin: S <=0.25    -  Gentamicin: S <=1 Should not be used as monotherapy    -  Oxacillin: S <=0.25    -  Penicillin: R 0.5    -  RIF- Rifampin: S <=1 Should not be used as monotherapy    Gram Stain:   Few polymorphonuclear leukocytes per low power field  No squamous epithelial cells per low power field  Few Gram Positive Cocci in Clusters per oil power field  Few Gram Negative Rods per oil power field    Specimen Source: .Bronchial RIGHT LUNG WASHINGS POST BRUSHING    Culture Results:   Numerous Staphylococcus aureus  Normal Respiratory Мария absent    Organism Identification: Staphylococcus aureus    Organism: Staphylococcus aureus    Method Type: NAZARIO           < from: CT Angio Chest PE Protocol w/ IV Cont (03.22.19 @ 17:53) >    EXAM:  CTA CHEST PE PROTOCOL (W)AW IC                            PROCEDURE DATE:  03/22/2019          INTERPRETATION:  CTA CHEST PE PROTOCOL (W)AW IC    HISTORY:  Hemoptysis    TECHNIQUE: Contrast enhanced CT pulmonary angiogram was performed.   Multiplanar CT and HRCT images were reviewed. Maximum intensity   projection (MIP) images are reconstructed as per CT angiography protocol.   Images were acquired during the administration of 90 cc Omnipaque 350 IV   contrast. This study was performed using automatic exposure control   (radiation dose reduction software) to obtain a diagnostic image quality   scan with patient dose as low as reasonably achievable.    COMPARISON: None.    PULMONARY ARTERIES: No pulmonary embolism. Right upper lobe pulmonary   artery and interlobar artery are encased and narrowed by tumor.    LUNGS, AIRWAYS: Saber-sheath configuration of the trachea consistent with   COPD. Centrilobular emphysema. 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 Distal tree-in-bud   opacities in the right upper lobe.    PLEURA: No pleural abnormality.    VESSELS: Normal caliber aorta.    HEART: Normal heart size. No pericardial effusion. Coronary artery   calcifications are present.    MEDIASTINUM, ABDOULAYE, AXILLAE: Subcarinal and right hilar soft tissue   invasion.    UPPER ABDOMEN: Limited visualization is unremarkable.    BONES AND CHEST WALL: No aggressive lesion.    IMPRESSION:     No pulmonary embolism.    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 Right upper lobe pulmonary artery and interlobar artery are   encased and narrowed by tumor. Subcarinal and right hilar soft tissue   invasion.    < end of copied text >          Radiology: all available radiological tests reviewed    Advanced directives addressed: full resuscitation

## 2019-03-31 NOTE — PROGRESS NOTE ADULT - ASSESSMENT
65 yo male with a PMHx of HLD on Lipitor, lung mass seen on imaging prior to admission, admitted on 3/22 for evaluation of coughing up blood; the patient is a smoker since age 18 with one pack a day; on 3/27 the patient underwent bronchoscopy and cultures are growing Staph aureus. The patient denies fever or chills but has lost weight over the past few months. Notes mild post tussive vomiting but no further blood.     1. Low grade fever. Lung mass ?malignancy ?infection. Hemoptysis; s/p bronchoscopy with MSSA, possibly lung abscess versus colonization of lung mass   -leukocytosis which may be due to infection versus malignancy versus steroid effect  -on vancomycin IV # 3 and ceftriaxone # 3  -tolerating abx well so far; no side effects noted  -vancomycin level is slightly low  - cultures / sensitivity reviewed  -d/c vancomycin  -continue ceftriaxone for now  - iv hydration and supportive care   - serial cbc and monitor temperature   - pathology is pending  -continue abx coverage   - oncology evaluation in progress  2. other issues: hyperlipidemia  - per medicine

## 2019-03-31 NOTE — PROGRESS NOTE ADULT - ASSESSMENT
· Assessment		    # Hemoptysis with recent Dx of Lung Mass and possible Lung CA (Had outpatient PET CT)  # COPD exacerbation  # Cavitary lesion on bronchoscopy s/p brushing (culture and path pending)  # Leukocytosis may be due to infection vs steroid induced  # Probably infection of the endobronchial lesion on abx now  # DVT ppx    - d/w ,  s/p bronchoscopy, Await Staph Cx sensitivities, ID eval appreciated  - cardio/thoracic Sx eval appreciated.   - continue albuterol/budesonide  - taper to po prednisone today  - MRI of the brain with no mets  - Abx per ID    DC home when stable  Patient ambulating well. His brother is involved in decision making/medical care.

## 2019-03-31 NOTE — DISCHARGE NOTE PROVIDER - HOSPITAL COURSE
65 yo male with a PMHx of HLD on Lipitor, lung mass presents to the ED with sudden onset of coughing up blood since last night and today. Recently diagnosed with a lung mass after imaging by pulmonologist. Around 19:00-20:00 last night, patient reports sudden onset of  "coughing up red blood." After symptoms resolved, patient felt it was unnecessary to seek evaluation. At 14:00 today patient reports return of symptoms. Presents with mother and brother at bedside. No h/o similar symptoms. Does not take blood thinners. Denies fever. No sick contact. No recent travel. He is also having SOB for the last 2 weeks during his work. Patient has lost significant weight in the 4-5 months as per the brother at the bedside. As per the mother at the bedside he has lost his appetite around 6 months ago. He is a current smoker. Started to smoke when he turned 18 years. He used to smoke 1/2 pack-1 pack a day over all these years. He works as a  for buildings.         Hospital course: Patient was admitted for hemoptysis and underwent bronchoscopy and was noted to have cavitary lesion. Brushings and washings were done. The culture is growing staph aureus and ID placed him on IV abx. He was also treated for COPD eacerbation and is on PO prednisone taper at this time. He is being followed by Oncology  and . He has been ambulating well while hospitalized.             PHYSICAL EXAM:        Constitutional: NAD, awake and alert, well-developed    HEENT: PERR, EOMI, Normal Hearing, MMM    Neck: Soft and supple, No LAD, No JVD    Respiratory: Breath sounds are clear bilaterally, No wheezing, rales or rhonchi    Cardiovascular: S1 and S2, regular rate and rhythm, no Murmurs, gallops or rubs    Gastrointestinal: Bowel Sounds present, soft, nontender, nondistended, no guarding, no rebound    Extremities: No peripheral edema    Vascular: 2+ peripheral pulses    Neurological: A/O x 3, no focal deficits    Musculoskeletal: 5/5 strength b/l upper and lower extremities    Skin: No rashes        total time for discharge: 45 minutes 63 yo male with a PMHx of HLD on Lipitor, lung mass presents to the ED with sudden onset of coughing up blood since last night and today. Recently diagnosed with a lung mass after imaging by pulmonologist. Around 19:00-20:00 last night, patient reports sudden onset of  "coughing up red blood." After symptoms resolved, patient felt it was unnecessary to seek evaluation. At 14:00 today patient reports return of symptoms. Presents with mother and brother at bedside. No h/o similar symptoms. Does not take blood thinners. Denies fever. No sick contact. No recent travel. He is also having SOB for the last 2 weeks during his work. Patient has lost significant weight in the 4-5 months as per the brother at the bedside. As per the mother at the bedside he has lost his appetite around 6 months ago. He is a current smoker. Started to smoke when he turned 18 years. He used to smoke 1/2 pack-1 pack a day over all these years. He works as a  for buildings.         Hospital course: Patient was admitted for hemoptysis and underwent bronchoscopy and was noted to have cavitary lesion. Brushings and washings were done. The culture is growing staph aureus and ID placed him on IV abx. He was also treated for COPD eacerbation and is on PO prednisone taper at this time. He is being followed by Oncology  and . He has been ambulating well while hospitalized.             PHYSICAL EXAM:        Constitutional: NAD, awake and alert, well-developed    HEENT: PERR, EOMI, Normal Hearing, MMM    Neck: Soft and supple, No LAD, No JVD    Respiratory: Breath sounds are clear bilaterally, No wheezing, rales or rhonchi    Cardiovascular: S1 and S2, regular rate and rhythm, no Murmurs, gallops or rubs    Gastrointestinal: Bowel Sounds present, soft, nontender, nondistended, no guarding, no rebound    Extremities: No peripheral edema    Vascular: 2+ peripheral pulses    Neurological: A/O x 3, no focal deficits    Musculoskeletal: 5/5 strength b/l upper and lower extremities    Skin: No rashes            Hemoptysis with recent Dx of Lung Mass and possible Lung CA (Had outpatient PET CT)    # Cavitary lesion on bronchoscopy s/p brushing (culture and path pending)    # Leukocytosis may be due to infection vs steroid induced    # Probably infection of the endobronchial lesion on abx now    # DVT ppx            - fup with pulm and onc for final result on the bx/ culture; pt cleared for dc from id    - MRI of the brain with no mets    - Ceftin per ID; stop steroid no wheezing; afraid of immunosuppression and fungal overgrowth             total time for discharge: 45 minutes

## 2019-03-31 NOTE — PROGRESS NOTE ADULT - ASSESSMENT
- hemoptysis likely secondary to lung cancer  - severe copd with exacerbation  - active smoker   - HLD    PLAN     - PO prednisone  - thoracic following  - s/p Bronch, follow up cytology and cultures  - presented at tumor board  - mri brain negative for mets  - heme onc, ID following  - vanco d/bertram  - continue ceftriaxone  - pulmicort neb, will likely need inhaler as outpatient but has refused int he past  - follow up with me 1-2 weeks after discharge

## 2019-03-31 NOTE — PROGRESS NOTE ADULT - SUBJECTIVE AND OBJECTIVE BOX
HOSPITALIST ATTENDING PROGRESS NOTE    Chart and meds reviewed.  Patient seen and examined.    HPI:  3/25/19 pt seen and examined, says has seen  as outpatietn, was told may have malignancy, quit smoking 2 weeks ago, 44 years x 2 packs per day, less hemoptysis today. Lives with mom.     3/26/19 pt seen and examined, discussed with brother and mom    3/27/19 pt seen and examined, for bronchoscopy today.     3/28/19 pt seen and examined, taper steroids today to bid, d/w pulm.    3/29/19 pt seen and examined, d/w  and . Patient will need MRI of the brain, hemeonc evaluation with  today. Given the bronchoscopy culture is growing staph, ID eval is appreciated.     3/30/19 pt seen and examined, on IV abx, taper steroids	    3/31/19 pt feels better, on IV abx, no more hemoptysis. taper steroids to PO today    All 10 systems reviewed and found to be negative with the exception of what has been described above.    MEDICATIONS  (STANDING):  ALBUTerol/ipratropium for Nebulization 3 milliLiter(s) Nebulizer every 6 hours  atorvastatin 40 milliGRAM(s) Oral at bedtime  buDESOnide   0.5 milliGRAM(s) Respule 0.5 milliGRAM(s) Inhalation two times a day  cefTRIAXone Injectable. 1000 milliGRAM(s) IV Push every 24 hours  melatonin 3 milliGRAM(s) Oral at bedtime  predniSONE   Tablet 40 milliGRAM(s) Oral daily    MEDICATIONS  (PRN):  acetaminophen   Tablet .. 650 milliGRAM(s) Oral every 6 hours PRN Temp greater or equal to 38C (100.4F), Mild Pain (1 - 3)  ondansetron Injectable 4 milliGRAM(s) IV Push every 6 hours PRN Nausea and/or Vomiting      VITALS:  T(F): 99.7 (03-31-19 @ 04:49), Max: 100.6 (03-30-19 @ 20:50)  HR: 100 (03-31-19 @ 04:49) (76 - 100)  BP: 95/64 (03-31-19 @ 05:44) (90/52 - 103/67)  RR: 18 (03-31-19 @ 04:49) (17 - 18)  SpO2: 92% (03-31-19 @ 04:49) (92% - 95%)  Wt(kg): --    I&O's Summary    30 Mar 2019 07:01  -  31 Mar 2019 07:00  --------------------------------------------------------  IN: 0 mL / OUT: 600 mL / NET: -600 mL        CAPILLARY BLOOD GLUCOSE          PHYSICAL EXAM:    HEENT:  pupils equal and reactive, EOMI, no oropharyngeal lesions, erythema, exudates, oral thrush  NECK:   supple, no carotid bruits, no palpable lymph nodes, no thyromegaly  CV:  +S1, +S2, regular, no murmurs or rubs  RESP:   lungs clear to auscultation bilaterally, no wheezing, rales, rhonchi, good air entry bilaterally  BREAST:  not examined  GI:  abdomen soft, non-tender, non-distended, normal BS, no bruits, no abdominal masses, no palpable masses  RECTAL:  not examined  :  not examined  MSK:   normal muscle tone, no atrophy, no rigidity, no contractions  EXT:  no clubbing, no cyanosis, no edema, no calf pain, swelling or erythema  VASCULAR:  pulses equal and symmetric in the upper and lower extremities  NEURO:  AAOX3, no focal neurological deficits, follows all commands, able to move extremities spontaneously  SKIN:  no ulcers, lesions or rashes    LABS:                            11.7   21.14 )-----------( 417      ( 30 Mar 2019 05:37 )             34.5     03-30    132<L>  |  96  |  24<H>  ----------------------------<  137<H>  4.2   |  28  |  0.80    Ca    8.3<L>      30 Mar 2019 05:37    TPro  6.3  /  Alb  2.2<L>  /  TBili  0.3  /  DBili  x   /  AST  15  /  ALT  41  /  AlkPhos  72  03-30        LIVER FUNCTIONS - ( 30 Mar 2019 05:37 )  Alb: 2.2 g/dL / Pro: 6.3 gm/dL / ALK PHOS: 72 U/L / ALT: 41 U/L / AST: 15 U/L / GGT: x                                             CULTURES:

## 2019-03-31 NOTE — PROVIDER CONTACT NOTE (OTHER) - ACTION/TREATMENT ORDERED:
MD called and made aware. Tylenol 650 mg PO given. Temp rechecked with 99.1 F orally. Will continue to monitor pt.

## 2019-03-31 NOTE — DISCHARGE NOTE PROVIDER - PROVIDER TOKENS
PROVIDER:[TOKEN:[[id number]]],PROVIDER:[TOKEN:[[id number]]],PROVIDER:[TOKEN:[[id number]]],PROVIDER:[TOKEN:[[id number]]]

## 2019-03-31 NOTE — DISCHARGE NOTE PROVIDER - CARE PROVIDERS DIRECT ADDRESSES
,DirectAddress_Unknown,DirectAddress_Unknown,[email or web address],DirectAddress_Unknown

## 2019-03-31 NOTE — PROGRESS NOTE ADULT - SUBJECTIVE AND OBJECTIVE BOX
Subjective:  No complaints  No hemoptysis  Had vanco d/bertram after cultures revealed MSSA    Review of Systems:  All 10 systems reviewed in detailed and found to be negative with the exception of what has already been described above    Allergies:  No Known Allergies    Meds  MEDICATIONS  (STANDING):  ALBUTerol/ipratropium for Nebulization 3 milliLiter(s) Nebulizer every 6 hours  atorvastatin 40 milliGRAM(s) Oral at bedtime  buDESOnide   0.5 milliGRAM(s) Respule 0.5 milliGRAM(s) Inhalation two times a day  cefTRIAXone Injectable. 1000 milliGRAM(s) IV Push every 24 hours  melatonin 3 milliGRAM(s) Oral at bedtime  predniSONE   Tablet 40 milliGRAM(s) Oral daily    MEDICATIONS  (PRN):  acetaminophen   Tablet .. 650 milliGRAM(s) Oral every 6 hours PRN Temp greater or equal to 38C (100.4F), Mild Pain (1 - 3)  ondansetron Injectable 4 milliGRAM(s) IV Push every 6 hours PRN Nausea and/or Vomiting    Physical Exam  T(C): 37.6 (03-31-19 @ 04:49), Max: 38.1 (03-30-19 @ 20:50)  HR: 100 (03-31-19 @ 04:49) (76 - 100)  BP: 95/64 (03-31-19 @ 05:44) (90/52 - 103/67)  RR: 18 (03-31-19 @ 04:49) (17 - 18)  SpO2: 92% (03-31-19 @ 04:49) (92% - 95%)  Gen: Alert, oriented, no distress  HEENT: Anicteric sclera, moist mucous membranes, no JVD, no lymphadenopathy  Cardio: Regular rhythm and rate, normal S1S2, no murmurs  Resp: Clear to auscultation bilaterally, no wheezing or rhonchi  GI: Nontender, nondistended, normoactive bowel sounds  Ext: No cyanosis, clubbing or edema  Neuro: Nonfocal      Labs:                        11.7   21.14 )-----------( 417      ( 30 Mar 2019 05:37 )             34.5     03-30    132<L>  |  96  |  24<H>  ----------------------------<  137<H>  4.2   |  28  |  0.80    Ca    8.3<L>      30 Mar 2019 05:37    TPro  6.3  /  Alb  2.2<L>  /  TBili  0.3  /  DBili  x   /  AST  15  /  ALT  41  /  AlkPhos  72  03-30      INTERPRETATION:  CTA CHEST PE PROTOCOL (W)AW IC    HISTORY:  Hemoptysis    TECHNIQUE: Contrast enhanced CT pulmonary angiogram was performed.   Multiplanar CT and HRCT images were reviewed. Maximum intensity   projection (MIP) images are reconstructed as per CT angiography protocol.   Images were acquired during the administration of 90 cc Omnipaque 350 IV   contrast. This study was performed using automatic exposure control   (radiation dose reduction software) to obtain a diagnostic image quality   scan with patient dose as low as reasonably achievable.    COMPARISON: None.    PULMONARY ARTERIES: No pulmonary embolism. Right upper lobe pulmonary   artery and interlobar artery are encased and narrowed by tumor.    LUNGS, AIRWAYS: Saber-sheath configuration of the trachea consistent with   COPD. Centrilobular emphysema. 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 Distal tree-in-bud   opacities in the right upper lobe.    PLEURA: No pleural abnormality.    VESSELS: Normal caliber aorta.    HEART: Normal heart size. No pericardial effusion. Coronary artery   calcifications are present.    MEDIASTINUM, ABDOULAYE, AXILLAE: Subcarinal and right hilar soft tissue   invasion.    UPPER ABDOMEN: Limited visualization is unremarkable.    BONES AND CHEST WALL: No aggressive lesion.    IMPRESSION:     No pulmonary embolism.    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 Right upper lobe pulmonary artery and interlobar artery are   encased and narrowed by tumor. Subcarinal and right hilar soft tissue   invasion.      < end of copied text >        < from: MR Head w/wo IV Cont (03.29.19 @ 12:10) >  INTERPRETATION:      MR brain with and without gadolinium      CLINICAL INFORMATION:   Lung cancer, evaluate for metastatic disease      TECHNIQUE:   Sagittal and axial T1-weighted images, axial FLAIR images,   axial T2-weighted images, axial gradient echo images and axial diffusion   weighted images of the brain were obtained. Following 6.5 cc of Gadavist   administration, 1.0 cc discarded, isotropic volumetric and fast spin echo   T1-weighted images were obtained; this data was reformatted using image   post processing software in multiple imaging planes.          FINDINGS:   No prior similar studies are available for review.         The brain demonstrates mild periventricular and scattered bifrontal and   biparietal subcortical white matter ischemia. Following gadolinium   administration no abnormal enhancement occurs.  No acute cerebral   cortical infarct is found.   No intracranial hemorrhage is recognized.    No mass effect is found in the brain.          The ventricles, sulci and basal cisterns appear unremarkable.    The vertebral and internal carotid arteries demonstrate expected flow   voids indicating their patency.         The orbits are unremarkable.  The paranasal sinuses are significant for   mucosal thickening in the LEFT sphenoid and LEFT maxillary sinuses.  The   nasal cavity appears intact.  The nasopharynx is symmetric.  The central   skull base and petrous temporal bones are intact.  The calvarium appears   unremarkable.      IMPRESSION:  No abnormal enhancement is seen that would suggest   metastatic disease.  Mild periventricular and scattered bifrontal and   biparietal subcortical white matter ischemia.            < end of copied text >      BAL Negative AFB, numerous staph aureus (MSSA)

## 2019-04-01 ENCOUNTER — TRANSCRIPTION ENCOUNTER (OUTPATIENT)
Age: 64
End: 2019-04-01

## 2019-04-01 VITALS
HEART RATE: 86 BPM | DIASTOLIC BLOOD PRESSURE: 63 MMHG | SYSTOLIC BLOOD PRESSURE: 99 MMHG | RESPIRATION RATE: 16 BRPM | OXYGEN SATURATION: 96 % | TEMPERATURE: 98 F

## 2019-04-01 LAB
ANION GAP SERPL CALC-SCNC: 4 MMOL/L — LOW (ref 5–17)
BUN SERPL-MCNC: 22 MG/DL — SIGNIFICANT CHANGE UP (ref 7–23)
CALCIUM SERPL-MCNC: 8.2 MG/DL — LOW (ref 8.5–10.1)
CHLORIDE SERPL-SCNC: 95 MMOL/L — LOW (ref 96–108)
CO2 SERPL-SCNC: 31 MMOL/L — SIGNIFICANT CHANGE UP (ref 22–31)
CREAT SERPL-MCNC: 0.7 MG/DL — SIGNIFICANT CHANGE UP (ref 0.5–1.3)
GLUCOSE SERPL-MCNC: 114 MG/DL — HIGH (ref 70–99)
HCT VFR BLD CALC: 34 % — LOW (ref 39–50)
HGB BLD-MCNC: 11.9 G/DL — LOW (ref 13–17)
MCHC RBC-ENTMCNC: 30.3 PG — SIGNIFICANT CHANGE UP (ref 27–34)
MCHC RBC-ENTMCNC: 35 GM/DL — SIGNIFICANT CHANGE UP (ref 32–36)
MCV RBC AUTO: 86.5 FL — SIGNIFICANT CHANGE UP (ref 80–100)
NRBC # BLD: 0 /100 WBCS — SIGNIFICANT CHANGE UP (ref 0–0)
PLATELET # BLD AUTO: 418 K/UL — HIGH (ref 150–400)
POTASSIUM SERPL-MCNC: 3.9 MMOL/L — SIGNIFICANT CHANGE UP (ref 3.5–5.3)
POTASSIUM SERPL-SCNC: 3.9 MMOL/L — SIGNIFICANT CHANGE UP (ref 3.5–5.3)
RBC # BLD: 3.93 M/UL — LOW (ref 4.2–5.8)
RBC # FLD: 13.2 % — SIGNIFICANT CHANGE UP (ref 10.3–14.5)
SODIUM SERPL-SCNC: 130 MMOL/L — LOW (ref 135–145)
WBC # BLD: 20.33 K/UL — HIGH (ref 3.8–10.5)
WBC # FLD AUTO: 20.33 K/UL — HIGH (ref 3.8–10.5)

## 2019-04-01 RX ORDER — CEPHALEXIN 500 MG
1 CAPSULE ORAL
Qty: 40 | Refills: 0 | OUTPATIENT
Start: 2019-04-01 | End: 2019-04-10

## 2019-04-01 RX ORDER — IPRATROPIUM/ALBUTEROL SULFATE 18-103MCG
1 AEROSOL WITH ADAPTER (GRAM) INHALATION
Qty: 30 | Refills: 0 | OUTPATIENT
Start: 2019-04-01 | End: 2019-04-30

## 2019-04-01 RX ORDER — ASPIRIN/CALCIUM CARB/MAGNESIUM 324 MG
1 TABLET ORAL
Qty: 0 | Refills: 0 | COMMUNITY

## 2019-04-01 RX ORDER — CEPHALEXIN 500 MG
500 CAPSULE ORAL
Qty: 0 | Refills: 0 | Status: DISCONTINUED | OUTPATIENT
Start: 2019-04-01 | End: 2019-04-01

## 2019-04-01 RX ADMIN — Medication 500 MILLIGRAM(S): at 17:42

## 2019-04-01 RX ADMIN — Medication 500 MILLIGRAM(S): at 12:10

## 2019-04-01 RX ADMIN — Medication 650 MILLIGRAM(S): at 09:31

## 2019-04-01 RX ADMIN — Medication 3 MILLILITER(S): at 14:43

## 2019-04-01 RX ADMIN — Medication 40 MILLIGRAM(S): at 05:43

## 2019-04-01 NOTE — PROGRESS NOTE ADULT - ASSESSMENT
65 yo male with a PMHx of HLD on Lipitor, lung mass seen on imaging prior to admission, admitted on 3/22 for evaluation of coughing up blood; the patient is a smoker since age 18 with one pack a day; on 3/27 the patient underwent bronchoscopy and cultures are growing Staph aureus. The patient denies fever or chills but has lost weight over the past few months. Notes mild post tussive vomiting but no further blood.     1. Low grade fever. Lung mass ?malignancy ?infection. Hemoptysis; s/p bronchoscopy with MSSA, possibly lung abscess versus colonization of lung mass   -leukocytosis which may be due to infection versus malignancy versus steroid effect  -day #4 antibiotics; will change to keflex 500 mg po q 6 hours; complete 10 more days rx  - cultures / sensitivity reviewed   - pathology is pending  -continue abx coverage   - oncology evaluation in progress  2. other issues: hyperlipidemia  - per medicine  If further ID issues please reconsult

## 2019-04-01 NOTE — PROGRESS NOTE ADULT - SUBJECTIVE AND OBJECTIVE BOX
Date of service: 04-01-19 @ 10:15    Patient has minimal cough, clear sputum        ROS: no fever or chills; denies dizziness, no HA, no SOB, no abdominal pain, no diarrhea or constipation; no dysuria, no urinary frequency, no legs pain, no rashes    MEDICATIONS  (STANDING):  ALBUTerol/ipratropium for Nebulization 3 milliLiter(s) Nebulizer every 6 hours  atorvastatin 40 milliGRAM(s) Oral at bedtime  buDESOnide   0.5 milliGRAM(s) Respule 0.5 milliGRAM(s) Inhalation two times a day  cephalexin 500 milliGRAM(s) Oral four times a day  melatonin 3 milliGRAM(s) Oral at bedtime  predniSONE   Tablet 40 milliGRAM(s) Oral daily    MEDICATIONS  (PRN):  acetaminophen   Tablet .. 650 milliGRAM(s) Oral every 6 hours PRN Temp greater or equal to 38C (100.4F), Mild Pain (1 - 3)  ondansetron Injectable 4 milliGRAM(s) IV Push every 6 hours PRN Nausea and/or Vomiting      Vital Signs Last 24 Hrs  T(C): 37.3 (01 Apr 2019 04:49), Max: 38.2 (31 Mar 2019 20:06)  T(F): 99.1 (01 Apr 2019 04:49), Max: 100.7 (31 Mar 2019 20:06)  HR: 110 (01 Apr 2019 04:49) (78 - 110)  BP: 110/66 (01 Apr 2019 04:49) (109/65 - 119/87)  BP(mean): --  RR: 18 (31 Mar 2019 20:06) (16 - 18)  SpO2: 95% (01 Apr 2019 04:49) (93% - 95%)    Physical Exam:      Constitutional: frail looking  HEENT: NC/AT, EOMI, PERRLA, conjunctivae clear  Neck: supple; thyroid not palpable  Back: no tenderness  Respiratory: respiratory effort normal; scattered coarse breath sounds  Cardiovascular: S1S2 regular, no murmurs  Abdomen: soft, not tender, not distended, positive BS  Genitourinary: no suprapubic tenderness  Musculoskeletal: no muscle tenderness, no joint swelling or tenderness  Neurological/ Psychiatric: AxOx3, moving all extremities  Skin: no rashes; no palpable lesions    Labs: reviewed                          Labs:                        11.9   20.33 )-----------( 418      ( 01 Apr 2019 06:03 )             34.0     04-01    130<L>  |  95<L>  |  22  ----------------------------<  114<H>  3.9   |  31  |  0.70    Ca    8.2<L>      01 Apr 2019 06:03         Vancomycin Level, Trough: 9.1 ug/mL (03-31 @ 05:18)      Cultures:       Culture - Fungal, Bronchial (collected 03-27-19 @ 10:35)  Source: .Bronchial RIGHT LUNG WASHINGS POST BRUSHING  Preliminary Report (03-28-19 @ 11:27):    Testing in progress    Culture - Acid Fast - Bronchial w/Smear (collected 03-27-19 @ 10:35)  Source: .Bronchial RIGHT LUNG WASHINGS POST BRUSHING  Preliminary Report (03-30-19 @ 15:06):    Culture is being performed.    Culture - Bronchial (collected 03-27-19 @ 10:35)  Source: .Bronchial RIGHT LUNG WASHINGS POST BRUSHING  Gram Stain (03-28-19 @ 00:14):    Few polymorphonuclear leukocytes per low power field    No squamous epithelial cells per low power field    Few Gram Positive Cocci in Clusters per oil power field    Few Gram Negative Rods per oil power field  Final Report (03-29-19 @ 19:20):    Numerous Staphylococcus aureus    Normal Respiratory Мария absent  Organism: Staphylococcus aureus (03-29-19 @ 19:20)  Organism: Staphylococcus aureus (03-29-19 @ 19:20)      -  Ampicillin/Sulbactam: S <=8/4      -  Cefazolin: S <=4      -  Clindamycin: S <=0.25      -  Erythromycin: S <=0.25      -  Gentamicin: S <=1 Should not be used as monotherapy      -  Oxacillin: S <=0.25      -  Penicillin: R 0.5      -  RIF- Rifampin: S <=1 Should not be used as monotherapy      -  Tetra/Doxy: S <=1      -  Trimethoprim/Sulfamethoxazole: S <=0.5/9.5      -  Vancomycin: S 2      Method Type: NAZARIO    Culture - Fungal, Bronchial (collected 03-27-19 @ 10:35)  Source: .Bronchial RIGHT LUNG WASHINGS PRE BRUSHINGS trap  Preliminary Report (03-29-19 @ 06:26):    Testing in progress    Culture - Acid Fast - Bronchial w/Smear (collected 03-27-19 @ 10:35)  Source: .Bronchial RIGHT LUNG WASHINGS PRE BRUSHINGS trap  Preliminary Report (03-30-19 @ 15:05):    Culture is being performed.    Culture - Bronchial (collected 03-27-19 @ 10:35)  Source: .Bronchial RIGHT LUNG WASHINGS PRE BRUSHINGS trap  Gram Stain (03-28-19 @ 12:53):    Few polymorphonuclear leukocytes per low power field    Rare Squamous epithelial cells per low power field    Moderate Gram Positive Cocci in Clusters per oil power field  Final Report (03-30-19 @ 13:26):    Numerous Staphylococcus aureus    Normal Respiratory Мария absent  Organism: Staphylococcus aureus (03-30-19 @ 13:26)  Organism: Staphylococcus aureus (03-30-19 @ 13:26)      -  Ampicillin/Sulbactam: S <=8/4      -  Cefazolin: S <=4      -  Clindamycin: S <=0.25      -  Erythromycin: S <=0.25      -  Gentamicin: S <=1 Should not be used as monotherapy      -  Oxacillin: S <=0.25      -  Penicillin: R 0.5      -  RIF- Rifampin: S <=1 Should not be used as monotherapy      -  Tetra/Doxy: S <=1      -  Trimethoprim/Sulfamethoxazole: S <=0.5/9.5      -  Vancomycin: S 2      Method Type: NAZARIO    Culture - Fungal, Bronchial (collected 03-27-19 @ 10:35)  Source: .Bronchial RIGHT LUNG WASHINGS PRE BRUSHINGS  Preliminary Report (03-28-19 @ 11:30):    Testing in progress    Culture - Acid Fast - Bronchial w/Smear (collected 03-27-19 @ 10:35)  Source: .Bronchial RIGHT LUNG WASHINGS PRE BRUSHINGS trap  Preliminary Report (03-30-19 @ 15:05):    Culture is being performed.    Culture - Acid Fast - Bronchial w/Smear (collected 03-27-19 @ 10:35)  Source: .Bronchial RIGHT LUNG WASHINGS PRE BRUSHINGS  Preliminary Report (03-30-19 @ 15:05):    Culture is being performed.    Culture - Bronchial (collected 03-27-19 @ 10:35)  Source: .Bronchial RIGHT LUNG WASHINGS PRE BRUSHINGS  Gram Stain (03-28-19 @ 00:12):    Few polymorphonuclear leukocytes per low power field    No squamous epithelial cells per low power field    Few Gram Positive Cocci in Clusters per oil power field  Final Report (03-29-19 @ 19:21):    Numerous Staphylococcus aureus    See previous culture [id number]    Normal Respiratory Мария absent         < from: CT Angio Chest PE Protocol w/ IV Cont (03.22.19 @ 17:53) >    EXAM:  CTA CHEST PE PROTOCOL (W)AW IC                            PROCEDURE DATE:  03/22/2019          INTERPRETATION:  CTA CHEST PE PROTOCOL (W)AW IC    HISTORY:  Hemoptysis    TECHNIQUE: Contrast enhanced CT pulmonary angiogram was performed.   Multiplanar CT and HRCT images were reviewed. Maximum intensity   projection (MIP) images are reconstructed as per CT angiography protocol.   Images were acquired during the administration of 90 cc Omnipaque 350 IV   contrast. This study was performed using automatic exposure control   (radiation dose reduction software) to obtain a diagnostic image quality   scan with patient dose as low as reasonably achievable.    COMPARISON: None.    PULMONARY ARTERIES: No pulmonary embolism. Right upper lobe pulmonary   artery and interlobar artery are encased and narrowed by tumor.    LUNGS, AIRWAYS: Saber-sheath configuration of the trachea consistent with   COPD. Centrilobular emphysema. 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 Distal tree-in-bud   opacities in the right upper lobe.    PLEURA: No pleural abnormality.    VESSELS: Normal caliber aorta.    HEART: Normal heart size. No pericardial effusion. Coronary artery   calcifications are present.    MEDIASTINUM, ABDOULAYE, AXILLAE: Subcarinal and right hilar soft tissue   invasion.    UPPER ABDOMEN: Limited visualization is unremarkable.    BONES AND CHEST WALL: No aggressive lesion.    IMPRESSION:     No pulmonary embolism.    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 Right upper lobe pulmonary artery and interlobar artery are   encased and narrowed by tumor. Subcarinal and right hilar soft tissue   invasion.    < end of copied text >          Radiology: all available radiological tests reviewed    Advanced directives addressed: full resuscitation

## 2019-04-01 NOTE — DISCHARGE NOTE NURSING/CASE MANAGEMENT/SOCIAL WORK - NSDCDPATPORTLINK_GEN_ALL_CORE
You can access the [facility] Patient Portal, offered by [facility], by registering with the following website: [email or web address]

## 2019-04-01 NOTE — DISCHARGE NOTE NURSING/CASE MANAGEMENT/SOCIAL WORK - NSDCPEWEB_GEN_ALL_CORE
[facility] for Tobacco Control website --- [email or web address] website --- [email or web address]

## 2019-04-01 NOTE — PROGRESS NOTE ADULT - REASON FOR ADMISSION
Hemoptysis
complain of coughing up blood

## 2019-04-01 NOTE — PROGRESS NOTE ADULT - ASSESSMENT
- hemoptysis likely secondary to lung cancer  - severe copd with exacerbation  - active smoker   - HLD    PLAN     - PO prednisone taper as outpatient  - switch to PO abx  - thoracic following  - s/p Bronch, follow up cytology and cultures  - presented at tumor board  - mri brain negative for mets  - heme onc, ID following  - vanco d/bertram  - pulmicort neb, will likely need inhaler as outpatient but has refused int he past  - follow up with me 1-2 weeks after discharge - hemoptysis likely secondary to lung cancer  - severe copd with exacerbation  - active smoker   - HLD    PLAN     - PO prednisone taper as outpatient  - switch to PO abx  - thoracic following  - s/p Bronch, follow up cytology and cultures  - presented at tumor board  - mri brain negative for mets  - heme onc, ID following  - vanco d/bertram  - pulmicort neb, will likely need inhaler as outpatient but has refused int he past  - follow up with me 1-2 weeks after discharge    Spoke to pathology today, brushings nondiagnostic for cancer although we still have a high index of suspicion  Spoke to brother Joss; Mr. Hare will still need a biopsy to make a diagnosis. Discussed that he should see Dr. Roldan or another thoracic surgeon for this. He will try to coordinate making the appointments on behalf of his brother.  He can follow up with me for any help through the process

## 2019-04-01 NOTE — PROVIDER CONTACT NOTE (OTHER) - SITUATION
DC Note faxed to Dr Julienne Church @ [phone]
Patient has temp 100.6 F orally, HR 96, /67, RR 18, SpO2 94% on room air.
spoke with Jose at office to inform  that patient is in hospital

## 2019-04-01 NOTE — DISCHARGE NOTE NURSING/CASE MANAGEMENT/SOCIAL WORK - NSDCPEEMAIL_GEN_ALL_CORE
[facility] for Tobacco Control email [email or web address]

## 2019-04-01 NOTE — PROGRESS NOTE ADULT - PROVIDER SPECIALTY LIST ADULT
Hospitalist
Infectious Disease
Pulmonology
Thoracic Surgery
Pulmonology
Hospitalist

## 2019-04-01 NOTE — PROGRESS NOTE ADULT - SUBJECTIVE AND OBJECTIVE BOX
Subjective:  No hemoptysis  No complaints    Review of Systems:  All 10 systems reviewed in detailed and found to be negative with the exception of what has already been described above    Allergies:  No Known Allergies    Meds  MEDICATIONS  (STANDING):  ALBUTerol/ipratropium for Nebulization 3 milliLiter(s) Nebulizer every 6 hours  atorvastatin 40 milliGRAM(s) Oral at bedtime  buDESOnide   0.5 milliGRAM(s) Respule 0.5 milliGRAM(s) Inhalation two times a day  cephalexin 500 milliGRAM(s) Oral four times a day  melatonin 3 milliGRAM(s) Oral at bedtime  predniSONE   Tablet 40 milliGRAM(s) Oral daily    MEDICATIONS  (PRN):  acetaminophen   Tablet .. 650 milliGRAM(s) Oral every 6 hours PRN Temp greater or equal to 38C (100.4F), Mild Pain (1 - 3)  ondansetron Injectable 4 milliGRAM(s) IV Push every 6 hours PRN Nausea and/or Vomiting    Physical Exam  T(C): 36.6 (04-01-19 @ 12:40), Max: 38.2 (03-31-19 @ 20:06)  HR: 77 (04-01-19 @ 14:47) (77 - 110)  BP: 99/63 (04-01-19 @ 12:40) (99/63 - 119/87)  RR: 16 (04-01-19 @ 12:40) (16 - 18)  SpO2: 96% (04-01-19 @ 12:40) (93% - 96%)  Gen: Alert, oriented, no distress  HEENT: Anicteric sclera, moist mucous membranes, no JVD, no lymphadenopathy  Cardio: Regular rhythm and rate, normal S1S2, no murmurs  Resp: Clear to auscultation bilaterally, no wheezing or rhonchi  GI: Nontender, nondistended, normoactive bowel sounds  Ext: No cyanosis, clubbing or edema  Neuro: Nonfocal    Labs:                        11.9   20.33 )-----------( 418      ( 01 Apr 2019 06:03 )             34.0     04-01    130<L>  |  95<L>  |  22  ----------------------------<  114<H>  3.9   |  31  |  0.70    Ca    8.2<L>      01 Apr 2019 06:03    INTERPRETATION:  CTA CHEST PE PROTOCOL (W)AW IC    HISTORY:  Hemoptysis    TECHNIQUE: Contrast enhanced CT pulmonary angiogram was performed.   Multiplanar CT and HRCT images were reviewed. Maximum intensity   projection (MIP) images are reconstructed as per CT angiography protocol.   Images were acquired during the administration of 90 cc Omnipaque 350 IV   contrast. This study was performed using automatic exposure control   (radiation dose reduction software) to obtain a diagnostic image quality   scan with patient dose as low as reasonably achievable.    COMPARISON: None.    PULMONARY ARTERIES: No pulmonary embolism. Right upper lobe pulmonary   artery and interlobar artery are encased and narrowed by tumor.    LUNGS, AIRWAYS: Saber-sheath configuration of the trachea consistent with   COPD. Centrilobular emphysema. 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 Distal tree-in-bud   opacities in the right upper lobe.    PLEURA: No pleural abnormality.    VESSELS: Normal caliber aorta.    HEART: Normal heart size. No pericardial effusion. Coronary artery   calcifications are present.    MEDIASTINUM, ABDOULAYE, AXILLAE: Subcarinal and right hilar soft tissue   invasion.    UPPER ABDOMEN: Limited visualization is unremarkable.    BONES AND CHEST WALL: No aggressive lesion.    IMPRESSION:     No pulmonary embolism.    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 Right upper lobe pulmonary artery and interlobar artery are   encased and narrowed by tumor. Subcarinal and right hilar soft tissue   invasion.      < end of copied text >        < from: MR Head w/wo IV Cont (03.29.19 @ 12:10) >  INTERPRETATION:      MR brain with and without gadolinium      CLINICAL INFORMATION:   Lung cancer, evaluate for metastatic disease      TECHNIQUE:   Sagittal and axial T1-weighted images, axial FLAIR images,   axial T2-weighted images, axial gradient echo images and axial diffusion   weighted images of the brain were obtained. Following 6.5 cc of Gadavist   administration, 1.0 cc discarded, isotropic volumetric and fast spin echo   T1-weighted images were obtained; this data was reformatted using image   post processing software in multiple imaging planes.          FINDINGS:   No prior similar studies are available for review.         The brain demonstrates mild periventricular and scattered bifrontal and   biparietal subcortical white matter ischemia. Following gadolinium   administration no abnormal enhancement occurs.  No acute cerebral   cortical infarct is found.   No intracranial hemorrhage is recognized.    No mass effect is found in the brain.          The ventricles, sulci and basal cisterns appear unremarkable.    The vertebral and internal carotid arteries demonstrate expected flow   voids indicating their patency.         The orbits are unremarkable.  The paranasal sinuses are significant for   mucosal thickening in the LEFT sphenoid and LEFT maxillary sinuses.  The   nasal cavity appears intact.  The nasopharynx is symmetric.  The central   skull base and petrous temporal bones are intact.  The calvarium appears   unremarkable.      IMPRESSION:  No abnormal enhancement is seen that would suggest   metastatic disease.  Mild periventricular and scattered bifrontal and   biparietal subcortical white matter ischemia.            < end of copied text >      BAL Negative AFB, numerous staph aureus (MSSA)

## 2019-04-02 PROBLEM — E78.5 HYPERLIPIDEMIA, UNSPECIFIED: Chronic | Status: ACTIVE | Noted: 2019-03-28

## 2019-04-02 LAB — SURGICAL PATHOLOGY STUDY: SIGNIFICANT CHANGE UP

## 2019-04-02 NOTE — HISTORY OF PRESENT ILLNESS
[FreeTextEntry1] : Mr. Hare is a 64 year old male, current smoker, admitted with hemoptysis to  and was found to have an irregular central 4.4. 4.1 cm spiculated nodule in the central right upper lobe narrowing the right upper lung bronchus and bronchus intermedius with subcarinal and right hilar soft tissue invasion.  Patient was sent for subsequent PET/CT which revealed ___________ Patient now presents for surgical evaluation.

## 2019-04-02 NOTE — CONSULT LETTER
[FreeTextEntry2] : Canelo Scott MD [FreeTextEntry3] : David Roldan MD\par Director of Thoracic, [facility]\par Cardiovascular & Thoracic Surgery\par \par [facility] \par [address] [place] [address]\[place] [address]

## 2019-04-07 ENCOUNTER — INPATIENT (INPATIENT)
Facility: HOSPITAL | Age: 64
LOS: 1 days | End: 2019-04-09
Attending: INTERNAL MEDICINE | Admitting: INTERNAL MEDICINE
Payer: COMMERCIAL

## 2019-04-07 VITALS — HEIGHT: 61 IN | WEIGHT: 113.98 LBS

## 2019-04-07 LAB
ALBUMIN SERPL ELPH-MCNC: 1.5 G/DL — LOW (ref 3.3–5)
ALP SERPL-CCNC: 217 U/L — HIGH (ref 40–120)
ALT FLD-CCNC: 127 U/L — HIGH (ref 12–78)
ANION GAP SERPL CALC-SCNC: 6 MMOL/L — SIGNIFICANT CHANGE UP (ref 5–17)
APTT BLD: 31.2 SEC — SIGNIFICANT CHANGE UP (ref 27.5–36.3)
AST SERPL-CCNC: 53 U/L — HIGH (ref 15–37)
BASOPHILS # BLD AUTO: 0 K/UL — SIGNIFICANT CHANGE UP (ref 0–0.2)
BASOPHILS NFR BLD AUTO: 0 % — SIGNIFICANT CHANGE UP (ref 0–2)
BILIRUB SERPL-MCNC: 0.4 MG/DL — SIGNIFICANT CHANGE UP (ref 0.2–1.2)
BLD GP AB SCN SERPL QL: SIGNIFICANT CHANGE UP
BUN SERPL-MCNC: 17 MG/DL — SIGNIFICANT CHANGE UP (ref 7–23)
CALCIUM SERPL-MCNC: 7.9 MG/DL — LOW (ref 8.5–10.1)
CHLORIDE SERPL-SCNC: 104 MMOL/L — SIGNIFICANT CHANGE UP (ref 96–108)
CO2 SERPL-SCNC: 28 MMOL/L — SIGNIFICANT CHANGE UP (ref 22–31)
CREAT SERPL-MCNC: 0.78 MG/DL — SIGNIFICANT CHANGE UP (ref 0.5–1.3)
EOSINOPHIL # BLD AUTO: 0.29 K/UL — SIGNIFICANT CHANGE UP (ref 0–0.5)
EOSINOPHIL NFR BLD AUTO: 2 % — SIGNIFICANT CHANGE UP (ref 0–6)
GLUCOSE SERPL-MCNC: 91 MG/DL — SIGNIFICANT CHANGE UP (ref 70–99)
HCT VFR BLD CALC: 27.7 % — LOW (ref 39–50)
HGB BLD-MCNC: 8.5 G/DL — LOW (ref 13–17)
HGB BLD-MCNC: 8.6 G/DL — LOW (ref 13–17)
HGB BLD-MCNC: 9.5 G/DL — LOW (ref 13–17)
INR BLD: 1.37 RATIO — HIGH (ref 0.88–1.16)
LACTATE SERPL-SCNC: 0.8 MMOL/L — SIGNIFICANT CHANGE UP (ref 0.7–2)
LYMPHOCYTES # BLD AUTO: 0.88 K/UL — LOW (ref 1–3.3)
LYMPHOCYTES # BLD AUTO: 6 % — LOW (ref 13–44)
MANUAL SMEAR VERIFICATION: SIGNIFICANT CHANGE UP
MCHC RBC-ENTMCNC: 30.1 PG — SIGNIFICANT CHANGE UP (ref 27–34)
MCHC RBC-ENTMCNC: 34.3 GM/DL — SIGNIFICANT CHANGE UP (ref 32–36)
MCV RBC AUTO: 87.7 FL — SIGNIFICANT CHANGE UP (ref 80–100)
MONOCYTES # BLD AUTO: 1.33 K/UL — HIGH (ref 0–0.9)
MONOCYTES NFR BLD AUTO: 9 % — SIGNIFICANT CHANGE UP (ref 2–14)
NEUTROPHILS # BLD AUTO: 12.23 K/UL — HIGH (ref 1.8–7.4)
NEUTROPHILS NFR BLD AUTO: 69 % — SIGNIFICANT CHANGE UP (ref 43–77)
NEUTS BAND # BLD: 14 % — HIGH (ref 0–8)
NRBC # BLD: 0 /100 — SIGNIFICANT CHANGE UP (ref 0–0)
NRBC # BLD: SIGNIFICANT CHANGE UP /100 WBCS (ref 0–0)
PLAT MORPH BLD: NORMAL — SIGNIFICANT CHANGE UP
PLATELET # BLD AUTO: 433 K/UL — HIGH (ref 150–400)
POTASSIUM SERPL-MCNC: 4.2 MMOL/L — SIGNIFICANT CHANGE UP (ref 3.5–5.3)
POTASSIUM SERPL-SCNC: 4.2 MMOL/L — SIGNIFICANT CHANGE UP (ref 3.5–5.3)
PROT SERPL-MCNC: 6.2 GM/DL — SIGNIFICANT CHANGE UP (ref 6–8.3)
PROTHROM AB SERPL-ACNC: 15.4 SEC — HIGH (ref 10–12.9)
RAPID RVP RESULT: SIGNIFICANT CHANGE UP
RBC # BLD: 3.16 M/UL — LOW (ref 4.2–5.8)
RBC # FLD: 14.6 % — HIGH (ref 10.3–14.5)
RBC BLD AUTO: NORMAL — SIGNIFICANT CHANGE UP
SODIUM SERPL-SCNC: 138 MMOL/L — SIGNIFICANT CHANGE UP (ref 135–145)
TYPE + AB SCN PNL BLD: SIGNIFICANT CHANGE UP
WBC # BLD: 14.74 K/UL — HIGH (ref 3.8–10.5)
WBC # FLD AUTO: 14.74 K/UL — HIGH (ref 3.8–10.5)

## 2019-04-07 PROCEDURE — 99285 EMERGENCY DEPT VISIT HI MDM: CPT

## 2019-04-07 PROCEDURE — 93010 ELECTROCARDIOGRAM REPORT: CPT

## 2019-04-07 PROCEDURE — 71045 X-RAY EXAM CHEST 1 VIEW: CPT | Mod: 26

## 2019-04-07 PROCEDURE — 71275 CT ANGIOGRAPHY CHEST: CPT | Mod: 26

## 2019-04-07 RX ORDER — ACETAMINOPHEN 500 MG
1000 TABLET ORAL ONCE
Qty: 0 | Refills: 0 | Status: COMPLETED | OUTPATIENT
Start: 2019-04-07 | End: 2019-04-07

## 2019-04-07 RX ORDER — ATORVASTATIN CALCIUM 80 MG/1
40 TABLET, FILM COATED ORAL AT BEDTIME
Qty: 0 | Refills: 0 | Status: DISCONTINUED | OUTPATIENT
Start: 2019-04-07 | End: 2019-04-08

## 2019-04-07 RX ORDER — ALBUTEROL 90 UG/1
2 AEROSOL, METERED ORAL EVERY 6 HOURS
Qty: 0 | Refills: 0 | Status: DISCONTINUED | OUTPATIENT
Start: 2019-04-07 | End: 2019-04-08

## 2019-04-07 RX ORDER — SODIUM CHLORIDE 9 MG/ML
1600 INJECTION INTRAMUSCULAR; INTRAVENOUS; SUBCUTANEOUS ONCE
Qty: 0 | Refills: 0 | Status: COMPLETED | OUTPATIENT
Start: 2019-04-07 | End: 2019-04-07

## 2019-04-07 RX ORDER — VANCOMYCIN HCL 1 G
1000 VIAL (EA) INTRAVENOUS ONCE
Qty: 0 | Refills: 0 | Status: COMPLETED | OUTPATIENT
Start: 2019-04-07 | End: 2019-04-07

## 2019-04-07 RX ORDER — ATORVASTATIN CALCIUM 80 MG/1
1 TABLET, FILM COATED ORAL
Qty: 0 | Refills: 0 | COMMUNITY

## 2019-04-07 RX ORDER — PIPERACILLIN AND TAZOBACTAM 4; .5 G/20ML; G/20ML
3.38 INJECTION, POWDER, LYOPHILIZED, FOR SOLUTION INTRAVENOUS ONCE
Qty: 0 | Refills: 0 | Status: COMPLETED | OUTPATIENT
Start: 2019-04-07 | End: 2019-04-07

## 2019-04-07 RX ORDER — CEPHALEXIN 500 MG
500 CAPSULE ORAL
Qty: 0 | Refills: 0 | Status: DISCONTINUED | OUTPATIENT
Start: 2019-04-07 | End: 2019-04-08

## 2019-04-07 RX ORDER — SODIUM CHLORIDE 9 MG/ML
1600 INJECTION, SOLUTION INTRAVENOUS ONCE
Qty: 0 | Refills: 0 | Status: COMPLETED | OUTPATIENT
Start: 2019-04-07 | End: 2019-04-07

## 2019-04-07 RX ORDER — SODIUM CHLORIDE 9 MG/ML
1000 INJECTION INTRAMUSCULAR; INTRAVENOUS; SUBCUTANEOUS
Qty: 0 | Refills: 0 | Status: DISCONTINUED | OUTPATIENT
Start: 2019-04-07 | End: 2019-04-08

## 2019-04-07 RX ADMIN — SODIUM CHLORIDE 3200 MILLILITER(S): 9 INJECTION INTRAMUSCULAR; INTRAVENOUS; SUBCUTANEOUS at 11:18

## 2019-04-07 RX ADMIN — PIPERACILLIN AND TAZOBACTAM 200 GRAM(S): 4; .5 INJECTION, POWDER, LYOPHILIZED, FOR SOLUTION INTRAVENOUS at 11:18

## 2019-04-07 RX ADMIN — Medication 250 MILLIGRAM(S): at 13:01

## 2019-04-07 RX ADMIN — Medication 1000 MILLIGRAM(S): at 11:18

## 2019-04-07 RX ADMIN — Medication 500 MILLIGRAM(S): at 18:13

## 2019-04-07 RX ADMIN — Medication 1000 MILLIGRAM(S): at 14:25

## 2019-04-07 RX ADMIN — ATORVASTATIN CALCIUM 40 MILLIGRAM(S): 80 TABLET, FILM COATED ORAL at 21:44

## 2019-04-07 RX ADMIN — PIPERACILLIN AND TAZOBACTAM 3.38 GRAM(S): 4; .5 INJECTION, POWDER, LYOPHILIZED, FOR SOLUTION INTRAVENOUS at 13:01

## 2019-04-07 RX ADMIN — SODIUM CHLORIDE 1600 MILLILITER(S): 9 INJECTION INTRAMUSCULAR; INTRAVENOUS; SUBCUTANEOUS at 14:25

## 2019-04-07 RX ADMIN — Medication 100 MILLIGRAM(S): at 14:30

## 2019-04-07 RX ADMIN — SODIUM CHLORIDE 125 MILLILITER(S): 9 INJECTION INTRAMUSCULAR; INTRAVENOUS; SUBCUTANEOUS at 23:34

## 2019-04-07 RX ADMIN — SODIUM CHLORIDE 125 MILLILITER(S): 9 INJECTION INTRAMUSCULAR; INTRAVENOUS; SUBCUTANEOUS at 16:12

## 2019-04-07 NOTE — H&P ADULT - HISTORY OF PRESENT ILLNESS
65 yo male - discharged from  on 4/1 now returning for admission on 4/7 due to massive hemoptysis - mother and brother at bedside.  PMHx of HLD on Lipitor, 4cm x 4cm RIGHT lung mass recently diagnosed.   On previous admission pt had bronchoscopy and was noted to have cavitary lesion. Brushings and washings were done but no definitive pathology result at that time the culture was growing staph aureus and he was treated with abx.    CTA Chest 4/7: No pulmonary embolism.  Approximately 4 x 4 cm neoplasm in the medial right upper lobe, is reidentified with narrowing the right upper lobe bronchus and bronchus intermedius. There are new large consolidations within the right upper lobe, most suggestive of postobstructive pneumonia.Right upper lobe pulmonary artery and interlobar artery are encased and narrowed by tumor.  Subcarinal and right hilar soft tissue invasion.  Right-sided osseous ridging at T8/T9, resulting in chronic appearing impingement on the thoracic cord and moderate central spinal canal   stenosis and narrowing of the right lateral recess.    Bleeding has subsided at time of my visit - discussed issues and plans with pt/mother/brother (who is HCP)  Advanced directives also discussed and MOLST completed on behalf of patients wishes.    Dr David Roldan was notified of patients admission/situation by ED MD Dr Scott.              DVT Prophylaxis: Chemoprophy contra due to acute hemorrahge, venodynes    Advanced Directives: FULL  Discussed with: patient - MOLST completed - states his brother is HCP (form on chart)    Visit Information: Critical care time 45 min.    ** Time is exclusive of billed procedures and/or teaching and/or routine family updates.

## 2019-04-07 NOTE — ED ADULT NURSE NOTE - OBJECTIVE STATEMENT
pt presents to ED from home, pt alert and orientedx4, febrile, and atchycardic, pt c/o fever, chills, couhg, with bloody sputum production, SOB and weakness since alst night, pt states he was recently in hospital and told he has a lung tumor, a biopsy was performed but unsuccessful as raymond did not take enough tissue, pt is to have another biopsy, unknown what tumor is. pt had appt for lapara tomorrow but now cancled. pt not in  resp distress, talking cleatly, safety maintained will continue to monitor

## 2019-04-07 NOTE — ED ADULT NURSE REASSESSMENT NOTE - NS ED NURSE REASSESS COMMENT FT1
patient would like his brother Rayray - likes to be called hillary - contacted regarding all medical matters [phone]

## 2019-04-07 NOTE — ED PROVIDER NOTE - OBJECTIVE STATEMENT
63 y/o m with PMHx of HLD, lung cancer presenting to the ED c/o cough productive of bloody sputum since last night. Recent admission for hemoptysis, right chest mass; DC'd April 1st, now having cough and bloody sputum. Denies fever, n/v/d. No recent travel or sick contacts. PCP: Dr. Tapia, Pulmo: Dr. Guy.

## 2019-04-07 NOTE — ED ADULT TRIAGE NOTE - CHIEF COMPLAINT QUOTE
pt was recently diagnosed with lung ca and had one episode of coughing up blood last night and one episode this morning. denies SOB.

## 2019-04-07 NOTE — ED STATDOCS - PROGRESS NOTE DETAILS
Tino Black for ED attending Dr. Vasquez: 63 y/o m with PMHx of HLD, lung cancer presenting to the ED c/o fever, cough productive of blood since last night. Recently dx with lung cancer, has not started treatment yet. Also reports chronic SOB. Pulmo: Dr. Guy. Will send pt to main ED for further evaluation.

## 2019-04-07 NOTE — ED PROVIDER NOTE - SECONDARY DIAGNOSIS.
Pneumonia of right upper lobe due to infectious organism Malignant neoplasm of upper lobe of right lung

## 2019-04-07 NOTE — ED PROVIDER NOTE - CARE PLAN
Principal Discharge DX:	Hemoptysis  Secondary Diagnosis:	Pneumonia of right upper lobe due to infectious organism  Secondary Diagnosis:	Malignant neoplasm of upper lobe of right lung

## 2019-04-07 NOTE — ED PROVIDER NOTE - CLINICAL SUMMARY MEDICAL DECISION MAKING FREE TEXT BOX
63 y/o m with hx of right sided lung mass presenting with fever and hemoptysis, plan for CXR, CTA chest. 65 y/o m with hx of right sided lung mass presenting with fever and hemoptysis, plan for CXR, CTA chest.    Pt with jesus hemoptysis.  CT surg will see pt after admission.  Admit to ICU.

## 2019-04-07 NOTE — H&P ADULT - ASSESSMENT
65yo M suffering from recurrent and significant hemoptysis due to RIGHT 4cm x 4cm lung tumor.  Anemia due to acute hemorrhage  obvious risk for deterioration, morbidity, mortality if bleeding continues    Plan at this time is for admission to ICU  serial Hgb  monitor for persistence of pulmonary hemorrhage/hemoptysis  IVF  thoracic sx consultation - will need bronchoscopy for diagnostic studies  GI and DVT prophylaxis as appropriate  HOB 30  supportive care    Above d/w pt/brother/mother and all questions answered.

## 2019-04-07 NOTE — PATIENT PROFILE ADULT - NSPROMUTANXFEARADDRESSFT_GEN_A_NUR
pt is aware of lung tumor and has discussed with brother and doctors, brother unsure pt. understanding diagnosis.

## 2019-04-07 NOTE — H&P ADULT - ADDITIONAL PE
Height (cm): 154.94 (04-07 @ 10:24)  Weight (kg): 51.7 (04-07 @ 10:24)  BMI (kg/m2): 21.5 (04-07 @ 10:24)  ICU Vital Signs Last 24 Hrs  T(F): 97.4 (07 Apr 2019 14:18), Max: 101.6 (07 Apr 2019 10:29)  HR: 97 (07 Apr 2019 14:18) (97 - 133)    BP: 100/- (07 Apr 2019 14:18) (95/64 - 117/72)  BP(mean): 58 (07 Apr 2019 14:18) (58 - 67)  RR: 16 (07 Apr 2019 14:18) (16 - 24)  SpO2: 99% (07 Apr 2019 14:18) (94% - 99%)

## 2019-04-07 NOTE — ED ADULT NURSE NOTE - NSIMPLEMENTINTERV_GEN_ALL_ED
Implemented All Fall Risk Interventions:  [place] to call system. Call bell, personal items and telephone within reach. Instruct patient to call for assistance. Room bathroom lighting operational. Non-slip footwear when patient is off stretcher. Physically safe environment: no spills, clutter or unnecessary equipment. Stretcher in lowest position, wheels locked, appropriate side rails in place. Provide visual cue, wrist band, yellow gown, etc. Monitor gait and stability. Monitor for mental status changes and reorient to person, place, and time. Review medications for side effects contributing to fall risk. Reinforce activity limits and safety measures with patient and family.

## 2019-04-07 NOTE — ED PROVIDER NOTE - PROGRESS NOTE DETAILS
Attending Sonia pt with episode of cough and jesus hymptosis.  ICU paged and Dr. Bejarano paged. Tino Black for ED attending Dr. Scott: Spoke to Dr. Roldan, suggests pt be admitted to set up for bronchoscopy and probable repeat biopsy. Attending Scott, I have re-evaluated the patient's fluid status and reviewed vital signs. Clinical evaluation demonstrates an appropriate response to fluid resuscitation.

## 2019-04-08 ENCOUNTER — APPOINTMENT (OUTPATIENT)
Dept: THORACIC SURGERY | Facility: CLINIC | Age: 64
End: 2019-04-08

## 2019-04-08 ENCOUNTER — APPOINTMENT (OUTPATIENT)
Dept: THORACIC SURGERY | Facility: HOSPITAL | Age: 64
End: 2019-04-08

## 2019-04-08 DIAGNOSIS — B95.61 METHICILLIN SUSCEPTIBLE STAPHYLOCOCCUS AUREUS INFECTION AS THE CAUSE OF DISEASES CLASSIFIED ELSEWHERE: ICD-10-CM

## 2019-04-08 DIAGNOSIS — F17.210 NICOTINE DEPENDENCE, CIGARETTES, UNCOMPLICATED: ICD-10-CM

## 2019-04-08 DIAGNOSIS — J44.0 CHRONIC OBSTRUCTIVE PULMONARY DISEASE WITH (ACUTE) LOWER RESPIRATORY INFECTION: ICD-10-CM

## 2019-04-08 DIAGNOSIS — R04.2 HEMOPTYSIS: ICD-10-CM

## 2019-04-08 DIAGNOSIS — C34.11 MALIGNANT NEOPLASM OF UPPER LOBE, RIGHT BRONCHUS OR LUNG: ICD-10-CM

## 2019-04-08 DIAGNOSIS — J44.1 CHRONIC OBSTRUCTIVE PULMONARY DISEASE WITH (ACUTE) EXACERBATION: ICD-10-CM

## 2019-04-08 DIAGNOSIS — J18.8 OTHER PNEUMONIA, UNSPECIFIED ORGANISM: ICD-10-CM

## 2019-04-08 DIAGNOSIS — E78.5 HYPERLIPIDEMIA, UNSPECIFIED: ICD-10-CM

## 2019-04-08 DIAGNOSIS — J40 BRONCHITIS, NOT SPECIFIED AS ACUTE OR CHRONIC: ICD-10-CM

## 2019-04-08 DIAGNOSIS — Z79.82 LONG TERM (CURRENT) USE OF ASPIRIN: ICD-10-CM

## 2019-04-08 DIAGNOSIS — E43 UNSPECIFIED SEVERE PROTEIN-CALORIE MALNUTRITION: ICD-10-CM

## 2019-04-08 DIAGNOSIS — D72.829 ELEVATED WHITE BLOOD CELL COUNT, UNSPECIFIED: ICD-10-CM

## 2019-04-08 DIAGNOSIS — T38.0X5A ADVERSE EFFECT OF GLUCOCORTICOIDS AND SYNTHETIC ANALOGUES, INITIAL ENCOUNTER: ICD-10-CM

## 2019-04-08 DIAGNOSIS — I10 ESSENTIAL (PRIMARY) HYPERTENSION: ICD-10-CM

## 2019-04-08 LAB
ANION GAP SERPL CALC-SCNC: 9 MMOL/L — SIGNIFICANT CHANGE UP (ref 5–17)
BASE EXCESS BLDA CALC-SCNC: -1.9 MMOL/L — SIGNIFICANT CHANGE UP (ref -2–2)
BASE EXCESS BLDA CALC-SCNC: -6.2 MMOL/L — LOW (ref -2–2)
BLOOD GAS COMMENTS ARTERIAL: SIGNIFICANT CHANGE UP
BLOOD GAS COMMENTS ARTERIAL: SIGNIFICANT CHANGE UP
BUN SERPL-MCNC: 21 MG/DL — SIGNIFICANT CHANGE UP (ref 7–23)
CALCIUM SERPL-MCNC: 7.3 MG/DL — LOW (ref 8.5–10.1)
CHLORIDE SERPL-SCNC: 111 MMOL/L — HIGH (ref 96–108)
CO2 SERPL-SCNC: 22 MMOL/L — SIGNIFICANT CHANGE UP (ref 22–31)
CREAT SERPL-MCNC: 0.61 MG/DL — SIGNIFICANT CHANGE UP (ref 0.5–1.3)
GAS PNL BLDA: SIGNIFICANT CHANGE UP
GAS PNL BLDA: SIGNIFICANT CHANGE UP
GLUCOSE SERPL-MCNC: 97 MG/DL — SIGNIFICANT CHANGE UP (ref 70–99)
HCO3 BLDA-SCNC: 23 MMOL/L — SIGNIFICANT CHANGE UP (ref 21–29)
HCO3 BLDA-SCNC: 23 MMOL/L — SIGNIFICANT CHANGE UP (ref 21–29)
HCT VFR BLD CALC: 20.6 % — CRITICAL LOW (ref 39–50)
HCT VFR BLD CALC: 29.7 % — LOW (ref 39–50)
HGB BLD-MCNC: 10.2 G/DL — LOW (ref 13–17)
HGB BLD-MCNC: 7.1 G/DL — LOW (ref 13–17)
HOROWITZ INDEX BLDA+IHG-RTO: SIGNIFICANT CHANGE UP
HOROWITZ INDEX BLDA+IHG-RTO: SIGNIFICANT CHANGE UP
MAGNESIUM SERPL-MCNC: 2.2 MG/DL — SIGNIFICANT CHANGE UP (ref 1.6–2.6)
MCHC RBC-ENTMCNC: 30.4 PG — SIGNIFICANT CHANGE UP (ref 27–34)
MCHC RBC-ENTMCNC: 30.6 PG — SIGNIFICANT CHANGE UP (ref 27–34)
MCHC RBC-ENTMCNC: 34.3 GM/DL — SIGNIFICANT CHANGE UP (ref 32–36)
MCHC RBC-ENTMCNC: 34.5 GM/DL — SIGNIFICANT CHANGE UP (ref 32–36)
MCV RBC AUTO: 88.7 FL — SIGNIFICANT CHANGE UP (ref 80–100)
MCV RBC AUTO: 88.8 FL — SIGNIFICANT CHANGE UP (ref 80–100)
NRBC # BLD: 0 /100 WBCS — SIGNIFICANT CHANGE UP (ref 0–0)
NRBC # BLD: 0 /100 WBCS — SIGNIFICANT CHANGE UP (ref 0–0)
PCO2 BLDA: 45 MMHG — SIGNIFICANT CHANGE UP (ref 32–46)
PCO2 BLDA: 74 MMHG — CRITICAL HIGH (ref 32–46)
PH BLDA: 7.12 — CRITICAL LOW (ref 7.35–7.45)
PH BLDA: 7.33 — LOW (ref 7.35–7.45)
PHOSPHATE SERPL-MCNC: 2.8 MG/DL — SIGNIFICANT CHANGE UP (ref 2.5–4.5)
PLATELET # BLD AUTO: 267 K/UL — SIGNIFICANT CHANGE UP (ref 150–400)
PLATELET # BLD AUTO: 340 K/UL — SIGNIFICANT CHANGE UP (ref 150–400)
PO2 BLDA: 297 MMHG — HIGH (ref 74–108)
PO2 BLDA: 75 MMHG — SIGNIFICANT CHANGE UP (ref 74–108)
POTASSIUM SERPL-MCNC: 4 MMOL/L — SIGNIFICANT CHANGE UP (ref 3.5–5.3)
POTASSIUM SERPL-SCNC: 4 MMOL/L — SIGNIFICANT CHANGE UP (ref 3.5–5.3)
RBC # BLD: 2.32 M/UL — LOW (ref 4.2–5.8)
RBC # BLD: 3.35 M/UL — LOW (ref 4.2–5.8)
RBC # FLD: 14.4 % — SIGNIFICANT CHANGE UP (ref 10.3–14.5)
RBC # FLD: 14.6 % — HIGH (ref 10.3–14.5)
SAO2 % BLDA: 88 % — LOW (ref 92–96)
SAO2 % BLDA: 99 % — HIGH (ref 92–96)
SODIUM SERPL-SCNC: 142 MMOL/L — SIGNIFICANT CHANGE UP (ref 135–145)
WBC # BLD: 12.82 K/UL — HIGH (ref 3.8–10.5)
WBC # BLD: 12.99 K/UL — HIGH (ref 3.8–10.5)
WBC # FLD AUTO: 12.82 K/UL — HIGH (ref 3.8–10.5)
WBC # FLD AUTO: 12.99 K/UL — HIGH (ref 3.8–10.5)

## 2019-04-08 PROCEDURE — 31635 BRONCHOSCOPY W/FB REMOVAL: CPT

## 2019-04-08 PROCEDURE — 71045 X-RAY EXAM CHEST 1 VIEW: CPT | Mod: 26

## 2019-04-08 PROCEDURE — 99255 IP/OBS CONSLTJ NEW/EST HI 80: CPT

## 2019-04-08 RX ORDER — SODIUM CHLORIDE 9 MG/ML
1000 INJECTION INTRAMUSCULAR; INTRAVENOUS; SUBCUTANEOUS ONCE
Qty: 0 | Refills: 0 | Status: COMPLETED | OUTPATIENT
Start: 2019-04-08 | End: 2019-04-08

## 2019-04-08 RX ORDER — PHENYLEPHRINE HYDROCHLORIDE 10 MG/ML
0.1 INJECTION INTRAVENOUS
Qty: 40 | Refills: 0 | Status: DISCONTINUED | OUTPATIENT
Start: 2019-04-08 | End: 2019-04-08

## 2019-04-08 RX ORDER — MORPHINE SULFATE 50 MG/1
2 CAPSULE, EXTENDED RELEASE ORAL
Qty: 100 | Refills: 0 | Status: DISCONTINUED | OUTPATIENT
Start: 2019-04-08 | End: 2019-04-09

## 2019-04-08 RX ORDER — ROCURONIUM BROMIDE 10 MG/ML
50 VIAL (ML) INTRAVENOUS ONCE
Qty: 0 | Refills: 0 | Status: COMPLETED | OUTPATIENT
Start: 2019-04-08 | End: 2019-04-08

## 2019-04-08 RX ORDER — CISATRACURIUM BESYLATE 2 MG/ML
3 INJECTION INTRAVENOUS
Qty: 200 | Refills: 0 | Status: DISCONTINUED | OUTPATIENT
Start: 2019-04-08 | End: 2019-04-08

## 2019-04-08 RX ORDER — MORPHINE SULFATE 50 MG/1
2 CAPSULE, EXTENDED RELEASE ORAL
Qty: 0 | Refills: 0 | Status: DISCONTINUED | OUTPATIENT
Start: 2019-04-08 | End: 2019-04-09

## 2019-04-08 RX ORDER — ROBINUL 0.2 MG/ML
0.2 INJECTION INTRAMUSCULAR; INTRAVENOUS EVERY 4 HOURS
Qty: 0 | Refills: 0 | Status: DISCONTINUED | OUTPATIENT
Start: 2019-04-08 | End: 2019-04-09

## 2019-04-08 RX ORDER — PROPOFOL 10 MG/ML
20 INJECTION, EMULSION INTRAVENOUS
Qty: 1000 | Refills: 0 | Status: DISCONTINUED | OUTPATIENT
Start: 2019-04-08 | End: 2019-04-09

## 2019-04-08 RX ORDER — FENTANYL CITRATE 50 UG/ML
50 INJECTION INTRAVENOUS
Qty: 0 | Refills: 0 | Status: DISCONTINUED | OUTPATIENT
Start: 2019-04-08 | End: 2019-04-08

## 2019-04-08 RX ORDER — VECURONIUM BROMIDE 20 MG/1
1 INJECTION, POWDER, FOR SOLUTION INTRAVENOUS
Qty: 100 | Refills: 0 | Status: DISCONTINUED | OUTPATIENT
Start: 2019-04-08 | End: 2019-04-08

## 2019-04-08 RX ORDER — CHLORHEXIDINE GLUCONATE 213 G/1000ML
15 SOLUTION TOPICAL
Qty: 0 | Refills: 0 | Status: DISCONTINUED | OUTPATIENT
Start: 2019-04-08 | End: 2019-04-09

## 2019-04-08 RX ADMIN — Medication 2 MILLIGRAM(S): at 21:08

## 2019-04-08 RX ADMIN — MORPHINE SULFATE 2 MILLIGRAM(S): 50 CAPSULE, EXTENDED RELEASE ORAL at 21:48

## 2019-04-08 RX ADMIN — ALBUTEROL 2 PUFF(S): 90 AEROSOL, METERED ORAL at 02:30

## 2019-04-08 RX ADMIN — CHLORHEXIDINE GLUCONATE 15 MILLILITER(S): 213 SOLUTION TOPICAL at 05:27

## 2019-04-08 RX ADMIN — PHENYLEPHRINE HYDROCHLORIDE 1.95 MICROGRAM(S)/KG/MIN: 10 INJECTION INTRAVENOUS at 04:36

## 2019-04-08 RX ADMIN — SODIUM CHLORIDE 2000 MILLILITER(S): 9 INJECTION INTRAMUSCULAR; INTRAVENOUS; SUBCUTANEOUS at 02:22

## 2019-04-08 RX ADMIN — MORPHINE SULFATE 2 MILLIGRAM(S): 50 CAPSULE, EXTENDED RELEASE ORAL at 22:48

## 2019-04-08 RX ADMIN — PROPOFOL 6.24 MICROGRAM(S)/KG/MIN: 10 INJECTION, EMULSION INTRAVENOUS at 16:04

## 2019-04-08 RX ADMIN — ROBINUL 0.2 MILLIGRAM(S): 0.2 INJECTION INTRAMUSCULAR; INTRAVENOUS at 19:29

## 2019-04-08 RX ADMIN — SODIUM CHLORIDE 125 MILLILITER(S): 9 INJECTION INTRAMUSCULAR; INTRAVENOUS; SUBCUTANEOUS at 10:53

## 2019-04-08 RX ADMIN — SODIUM CHLORIDE 1000 MILLILITER(S): 9 INJECTION INTRAMUSCULAR; INTRAVENOUS; SUBCUTANEOUS at 03:08

## 2019-04-08 RX ADMIN — Medication 500 MILLIGRAM(S): at 00:45

## 2019-04-08 RX ADMIN — FENTANYL CITRATE 50 MICROGRAM(S): 50 INJECTION INTRAVENOUS at 03:00

## 2019-04-08 RX ADMIN — MORPHINE SULFATE 2 MG/HR: 50 CAPSULE, EXTENDED RELEASE ORAL at 17:03

## 2019-04-08 RX ADMIN — ALBUTEROL 2 PUFF(S): 90 AEROSOL, METERED ORAL at 08:56

## 2019-04-08 RX ADMIN — MORPHINE SULFATE 2 MILLIGRAM(S): 50 CAPSULE, EXTENDED RELEASE ORAL at 21:08

## 2019-04-08 RX ADMIN — ROBINUL 0.2 MILLIGRAM(S): 0.2 INJECTION INTRAMUSCULAR; INTRAVENOUS at 23:32

## 2019-04-08 RX ADMIN — SODIUM CHLORIDE 2000 MILLILITER(S): 9 INJECTION INTRAMUSCULAR; INTRAVENOUS; SUBCUTANEOUS at 13:39

## 2019-04-08 RX ADMIN — CISATRACURIUM BESYLATE 9.36 MICROGRAM(S)/KG/MIN: 2 INJECTION INTRAVENOUS at 14:30

## 2019-04-08 RX ADMIN — PROPOFOL 6.24 MICROGRAM(S)/KG/MIN: 10 INJECTION, EMULSION INTRAVENOUS at 05:26

## 2019-04-08 RX ADMIN — Medication 50 MILLIGRAM(S): at 14:19

## 2019-04-08 RX ADMIN — MORPHINE SULFATE 2 MILLIGRAM(S): 50 CAPSULE, EXTENDED RELEASE ORAL at 19:57

## 2019-04-08 RX ADMIN — FENTANYL CITRATE 50 MICROGRAM(S): 50 INJECTION INTRAVENOUS at 02:22

## 2019-04-08 RX ADMIN — MORPHINE SULFATE 2 MILLIGRAM(S): 50 CAPSULE, EXTENDED RELEASE ORAL at 19:29

## 2019-04-08 RX ADMIN — Medication 50 MILLIGRAM(S): at 14:22

## 2019-04-08 RX ADMIN — MORPHINE SULFATE 2 MILLIGRAM(S): 50 CAPSULE, EXTENDED RELEASE ORAL at 23:32

## 2019-04-08 RX ADMIN — PROPOFOL 6.24 MICROGRAM(S)/KG/MIN: 10 INJECTION, EMULSION INTRAVENOUS at 02:23

## 2019-04-08 RX ADMIN — MORPHINE SULFATE 2 MG/HR: 50 CAPSULE, EXTENDED RELEASE ORAL at 17:20

## 2019-04-08 RX ADMIN — Medication 2 MILLIGRAM(S): at 02:22

## 2019-04-08 RX ADMIN — Medication 500 MILLIGRAM(S): at 05:27

## 2019-04-08 RX ADMIN — PHENYLEPHRINE HYDROCHLORIDE 1.95 MICROGRAM(S)/KG/MIN: 10 INJECTION INTRAVENOUS at 10:52

## 2019-04-08 NOTE — CONSULT NOTE ADULT - ASSESSMENT
71M with jesus hemoptysis secondary to RUL cavitary lesion, most likely cancer.      Underwent an emergent bedside bronchoscopy and bronchial blocker placement.  After procedure family decided on comfort measures only.

## 2019-04-08 NOTE — DIETITIAN INITIAL EVALUATION ADULT. - PERTINENT MEDS FT
MEDICATIONS  (STANDING):  ALBUTerol    90 MICROgram(s) HFA Inhaler 2 Puff(s) Inhalation every 6 hours  atorvastatin 40 milliGRAM(s) Oral at bedtime  cephalexin 500 milliGRAM(s) Oral four times a day  chlorhexidine 0.12% Liquid 15 milliLiter(s) Oral Mucosa two times a day  phenylephrine    Infusion 0.1 MICROgram(s)/kG/Min (1.95 mL/Hr) IV Continuous <Continuous>  propofol Infusion 20 MICROgram(s)/kG/Min (6.24 mL/Hr) IV Continuous <Continuous>  sodium chloride 0.9%. 1000 milliLiter(s) (125 mL/Hr) IV Continuous <Continuous>    MEDICATIONS  (PRN):  fentaNYL    Injectable 50 MICROGram(s) IV Push every 2 hours PRN sedation  LORazepam   Injectable 2 milliGRAM(s) IV Push every 2 hours PRN Agitation

## 2019-04-08 NOTE — DISCHARGE NOTE FOR THE EXPIRED PATIENT - HOSPITAL COURSE
Mr. Hare was readmitted on  secondary to massive hemoptysis.  CT revealed an approximately 4 x 4 cm neoplasm in the medial right upper lobe.   His bleeding subsided initially however he then began hemoptysizing again and was intubated.   On the morning of , he had bright red blood pouring from the ETT.  He was suctioned and given blood.  He had an emergent bronchoscopy performed.   He was seen by IR for possible embolization  After a long discussion with IR and thoracic, family ultimately decided to pursue comfort measures  Patient ultimately  on  at 1:03 am

## 2019-04-08 NOTE — CONSULT NOTE ADULT - SUBJECTIVE AND OBJECTIVE BOX
Vascular & Interventional Radiology Consult Note    HPI: 64y Male with R lung cancer causing massive hemoptysis. IR called to eval for bronchial artery embolization.     Allergies:   Medications (Abx/Cardiac/Anticoagulation/Blood Products)  cephalexin: 500 milliGRAM(s) Oral (04-08 @ 05:27)  phenylephrine    Infusion: 1.95 mL/Hr IV Continuous (04-08 @ 04:36)  piperacillin/tazobactam IVPB.: 200 mL/Hr IV Intermittent (04-07 @ 11:18)  vancomycin  IVPB: 250 mL/Hr IV Intermittent (04-07 @ 13:01)    Data:    T(C): 36.1  HR: 96  BP: 108/60  RR: 18  SpO2: 96%    Exam  General: Intubated. sedated.     -WBC 12.99 / HgB 10.2 / Hct 29.7 / Plt 340  -Na 142 / Cl 111 / BUN 21 / Glucose 97  -K 4.0 / CO2 22 / Cr 0.61  -ALT -- / Alk Phos -- / T.Bili --  -INR1.37    Imaging: CTA reviewed by me. parenchymal hemorrhage, large R lung mass adjacent to mediastinum, possible mediastinal infiltration. R bronchial artery possibly arising from intercostobronchial trunk, T3 intervertebral artery level.     Plan:   - 64y Male with massive hemoptysis  - R bronchial artery embolization is the best therapeutic option available at this time  - Long discussion with brother and mother of the patient with Fernando Anaya and Jaskaran present, and family is electing for comfort measures at this time, as they feel the patient's quality of life is likely to be poor even if he survives this acute episode.   - Risks/Benefits/alternatives explained with the family, including risk of possible paralysis in that the anterior spinal artery may sometimes arise from the bronchial artery.

## 2019-04-08 NOTE — DIETITIAN INITIAL EVALUATION ADULT. - PERTINENT LABORATORY DATA
04-07 Na138 mmol/L Glu 91 mg/dL K+ 4.2 mmol/L Cr  0.78 mg/dL BUN 17 mg/dL Phos n/a   Alb 1.5 g/dL<L> PAB n/a

## 2019-04-08 NOTE — DIETITIAN INITIAL EVALUATION ADULT. - PHYSICAL APPEARANCE
NFPE significant for severe muscle wasting; clavicle, deltoid, and temporal.  severe fat wasting; ribs./underweight/other (specify)

## 2019-04-08 NOTE — PROGRESS NOTE ADULT - SUBJECTIVE AND OBJECTIVE BOX
called emergently to see pt due to start of massive hemorrhage from lungs.  BRB pouring from ETT - pt with O2 desat into 80's  Ambu bagged and suctioned via ETT  2u PRBC ordered STAT with 2 units on standby.  Thoracic surgery and anesthesia at bedside.  Pt paralyzed with rocuronium.  Emergent bronchoscopy performed.  Bleeding seems to have subsided at present.  Remains on pressors.  Dr Green from IR reviewed CT and came to bedside to discuss case.  Proposed plan is for LEFT mainstem intubation if possible and then if stable enough will send to IR for attempted bronchial artery embolization.  If that fails, surgical options remain, but prognosis remains tenuous/uncertain.    A) acute and massive pulmonary hemorrhage from RIGHT upper lobe tumor      anemia and shock due to acute hemorrhage       P) as noted, LEFT mainstem intubation, PRBC transfusion, pressors as needed      IR +/- surgical intervention    Above d/w pts brother and mother in detail and all questions answered - high risk for deterioration, morbidity and mortality clearly expressed to them.    Critical care time 75 min excluding teaching, routine updates, procedures.

## 2019-04-08 NOTE — CHART NOTE - NSCHARTNOTEFT_GEN_A_CORE
Upon Nutritional Assessment by the Registered Dietitian your patient was determined to meet criteria / has evidence of the following diagnosis/diagnoses:          [ ]  Mild Protein Calorie Malnutrition        [ ]  Moderate Protein Calorie Malnutrition        [x] Severe Protein Calorie Malnutrition        [ ] Unspecified Protein Calorie Malnutrition        [ ] Underweight / BMI <19        [ ] Morbid Obesity / BMI > 40      Findings:  Upon visit, pt appears thin with BMI of 19.7.  NFPE significant for severe muscle wasting; clavicle, deltoid, and temporal.  severe fat wasting; ribs.  Wt loss reported of 30# (21%) over past 6-7 months, clinically significant.  Pt's family is at bedside and report that pt was eating well during last admission and PTA this admission. Reviewed last RD note from last admission; pt reported poor PO intake PTA.  no edema noted.  BM (+) 4/8.  juvencio score of 18, PU stage 1 on sacrum.        ***based on above, pt meets criteria for severe malnutrition in chronic illness (severe muscle/fat wasting; 21% wt loss x 6-7 months).***    Findings as based on:  •  Comprehensive nutrition assessment and consultation  •  Calorie counts (nutrient intake analysis)  •  Food acceptance and intake status from observations by staff  •  Follow up  •  Patient education  •  Intervention secondary to interdisciplinary rounds  •   concerns      Treatment:    The following diet has been recommended:  1) if pt extubated, advance diet as tolerated and add ensure enlive TID with gelatein BID   2) if pt unable to be extubated, consider starting osmolite 1.5 @ 20mL/hr and re-consult for f/u rec's   3) monitor length NPO, advancement/tolerance nutr source   4) daily wt checks   5) monitor lytes/mins; replete/correct prn    PROVIDER Section:     By signing this assessment you are acknowledging and agree with the diagnosis/diagnoses assigned by the Registered Dietitian    Comments:

## 2019-04-08 NOTE — CONSULT NOTE ADULT - SUBJECTIVE AND OBJECTIVE BOX
History of Present Illness:  64y Male who was admitted to  recently for hemoptysis, has RUL cavitary mass on CT scan.  Underwent a bronchoscopy during that admission and was discharged.  Returned with hemoptysis, was intubated over night.  Called to bedside for another episode of hemoptysis.    PMH/PSH:  Pulmonary hemorrhage  Lung cancer  HLD (hyperlipidemia)    No significant past surgical history      Relevant Family History  FAMILY HISTORY:  No pertinent family history in first degree relatives      SOCIAL HISTORY:  Smoker: [x] Yes  [ ] No        PACK YEARS:                         WHEN QUIT?  ETOH use: [ ] Yes  [x] No              FREQUENCY / QUANTITY:  Ilicit Drug use:  [ ] Yes  [x] No      MEDICATIONS  (STANDING):  ALBUTerol    90 MICROgram(s) HFA Inhaler 2 Puff(s) Inhalation every 6 hours  atorvastatin 40 milliGRAM(s) Oral at bedtime  cephalexin 500 milliGRAM(s) Oral four times a day  chlorhexidine 0.12% Liquid 15 milliLiter(s) Oral Mucosa two times a day  cisatracurium Infusion 3 MICROgram(s)/kG/Min (9.36 mL/Hr) IV Continuous <Continuous>  morphine  Infusion 2 mG/Hr (2 mL/Hr) IV Continuous <Continuous>  phenylephrine    Infusion 0.1 MICROgram(s)/kG/Min (1.95 mL/Hr) IV Continuous <Continuous>  propofol Infusion 20 MICROgram(s)/kG/Min (6.24 mL/Hr) IV Continuous <Continuous>  sodium chloride 0.9%. 1000 milliLiter(s) (125 mL/Hr) IV Continuous <Continuous>    MEDICATIONS  (PRN):  fentaNYL    Injectable 50 MICROGram(s) IV Push every 2 hours PRN sedation  LORazepam   Injectable 2 milliGRAM(s) IV Push every 2 hours PRN Agitation      Allergies: No Known Allergies                                                            LABS:                        10.2   12.99 )-----------( 340      ( 08 Apr 2019 09:54 )             29.7     04-08    142  |  111<H>  |  21  ----------------------------<  97  4.0   |  22  |  0.61    Ca    7.3<L>      08 Apr 2019 09:54  Phos  2.8     04-08  Mg     2.2     04-08    TPro  6.2  /  Alb  1.5<L>  /  TBili  0.4  /  DBili  x   /  AST  53<H>  /  ALT  127<H>  /  AlkPhos  217<H>  04-07    PT/INR - ( 07 Apr 2019 10:42 )   PT: 15.4 sec;   INR: 1.37 ratio         PTT - ( 07 Apr 2019 10:42 )  PTT:31.2 sec        Review of Systems         Constitutional: denies fever, chills, general malaise, weight loss, weight gain, diaphoresis   HEENT: denies dry mouth, sore throat, runny nose, photophobia, blurry vision, double vision, glasses, discharge, eye pain, difficulty hearing, vertigo, dysphagia, epistaxis, recent dental work    Respiratory: denies SOB, WOLF, cough, phlegm, wheezing  Cardiovascular: denies CP, palpitations, edema, diaphoresis   Gastrointestinal: denies nausea, vomiting, diarrhea, constipation, abdominal pain, melena   Genitourinary: denies dysuria, frequency, urgency, incontinence, hematuria   Skin/Breast: denies rash, hives, itching, masses, hair loss   Musculoskeletal: denies myalgias, arthritis, joint swelling, muscle weakness  Neurologic: denies syncope, LOC, headache, weakness, dizziness, parasthesias, numbness, seizures, confusion, dementia   Psychiatric: denies feeling anxious, depressed, suicidal, homicidal  Endocrine: denies increased fingerstick glucoses, cold or heat intolerance, polydipsia, polyuria, polyphagia   Hematology/Oncology: denies bruising, tender or enlarged lymph nodes   ROS negative x 10 systems except as noted above    T(C): 36.1  Max: 36.6   HR: 96   BP: 108/60  RR: 18   SpO2: 96%       Physical Exam  General: WD, WN.                                                       Neuro: sedated on ventilator                Eyes: PERRL, EOMI   ENT: Normal exam of nasal/oral mucosa with absence of cyanosis.   Barney hemoptysis in ET tube and ventilator tubing.  Neck: supple, no JVD, trachea midline   Chest: bilateral rhonchi, distant breath sounds.  CV: RRR, +S1S2  GI: soft, NT, ND, +BS, no organomegaly noted  Extremities: MCGUIRE x 4, no C/C/E, distal motor/neuro/circ intact  SKIN: warm, dry, intact

## 2019-04-08 NOTE — DIETITIAN INITIAL EVALUATION ADULT. - NS AS NUTRI INTERV ENTERAL NUTRITION
if pt is unable to be extubated, consider starting TF with Osmolite 1.5 @ 20mL/hr and re-consult for f/u rec's

## 2019-04-08 NOTE — PROCEDURE NOTE - ADDITIONAL PROCEDURE DETAILS
RN and ICU staff emergently called CTS team to bedside.  Patient had severe hemoptysis.  Patient was paralyzed, flexible bronchoscopy was performed, evacuation of clots from left air way and right bronchial blocker placed.
pt actively hemoptysizing in ICU, altered mental status.

## 2019-04-08 NOTE — CHART NOTE - NSCHARTNOTEFT_GEN_A_CORE
Called to the patient bedside emergently because of recurrent massive     Upon arrival the patient coughed up over 200cc bright red blood.  and was desating on 100%    decision made to protect the patient airway.      Called anesthesia to intubate    Patient intubated without incident  7.5 ET tube    CXR ordered  ABG  Will keep sedated  TS to see in the Morning  At this time the bleeding has stopped and Do not need to bronch at this time.      Called the patient brother Rayray to up date him.        Time 30 minutes

## 2019-04-08 NOTE — DIETITIAN INITIAL EVALUATION ADULT. - OTHER INFO
received consult for unintentional wt loss; 65yo male with PMH of HLD with recent Dx of Rt lung Mass. d/c'd on 4/1.  Readmitted for massive hemoptysis due to lung tumor pending broch? Pt intubated with propofol running at 15.6 mL/hr (=412Kcal/day). Upon visit, pt appears thin with BMI of 19.7.  NFPE significant for severe muscle wasting; clavicle, deltoid, and temporal.  severe fat wasting; ribs.  Wt loss reported of 30# (21%) over past 6-7 months, clinically significant.  Pt's family is at bedside and report that pt was eating well during last admission and PTA this admission. Reviewed last RD note from last admission; pt reported poor PO intake PTA.  no edema noted.  BM (+) 4/8.  juvencio score of 18, PU stage 1 on sacrum.  based on above, pt meets criteria for severe malnutrition in chronic illness (severe muscle/fat wasting; 21% wt loss x 6-7 months).  No labs from today recorded.  RECOMMENDATIONS: 1) if pt extubated, advance diet as tolerated and add ensure enlive TID with gelatein BID 2) if pt unable to be extubated, consider starting osmolite 1.5 @ 20mL/hr and re-consult for f/u rec's 3) monitor length NPO, advancement/tolerance nutr source 4) daily wt checks 5) monitor lytes/mins; replete/correct prn

## 2019-04-08 NOTE — PROGRESS NOTE ADULT - SUBJECTIVE AND OBJECTIVE BOX
once pt pulmonary hemorrhage controlled and relatively stabilized, had meeting with patient brother Rayray, pts mother, Dr Green from IR, Dr Jessica from thoracic sx.    Discussed possibility of IR intervention, but risks/benefits not acceptable to family in setting of likely incurable/inoperable tumor.    Agreed that IR might get pt over this hump only to suffer terrible death from the tumor or from surgery that would be unsuccessful due to location and size of the mass.    Given pts situation and known wishes, family decided in his best interest to instate DNR and DNI and allow compassionate liberation from vent.     called and came to bedside to pray with family.    Pt placed on morphine infusion, propofol continued for palliative sedation as family does not want patient to experience suffering in any way.    Extubated with family at bedside.    A) acute hypoxic respiratory failure      massive pulmonary hemorrhage      hemorrhagic shock      anemia due to acute hemorrhage      likely unresectable untreatable RIGHT upper lobe tumor.    P) compassionate liberation from vent      palliative analgesia       anxiolysis as needed        palliative sedation    DNR/DNI    Critical care time additional 45 min excluding teaching, routine updates, procedures.

## 2019-04-09 RX ADMIN — MORPHINE SULFATE 2 MILLIGRAM(S): 50 CAPSULE, EXTENDED RELEASE ORAL at 00:32

## 2019-04-09 RX ADMIN — MORPHINE SULFATE 2 MILLIGRAM(S): 50 CAPSULE, EXTENDED RELEASE ORAL at 00:12

## 2019-04-12 DIAGNOSIS — R04.2 HEMOPTYSIS: ICD-10-CM

## 2019-04-12 DIAGNOSIS — R04.89 HEMORRHAGE FROM OTHER SITES IN RESPIRATORY PASSAGES: ICD-10-CM

## 2019-04-12 DIAGNOSIS — E43 UNSPECIFIED SEVERE PROTEIN-CALORIE MALNUTRITION: ICD-10-CM

## 2019-04-12 DIAGNOSIS — E78.5 HYPERLIPIDEMIA, UNSPECIFIED: ICD-10-CM

## 2019-04-12 DIAGNOSIS — J96.01 ACUTE RESPIRATORY FAILURE WITH HYPOXIA: ICD-10-CM

## 2019-04-12 DIAGNOSIS — R57.8 OTHER SHOCK: ICD-10-CM

## 2019-04-12 DIAGNOSIS — D62 ACUTE POSTHEMORRHAGIC ANEMIA: ICD-10-CM

## 2019-04-12 DIAGNOSIS — Z66 DO NOT RESUSCITATE: ICD-10-CM

## 2019-04-12 DIAGNOSIS — D49.1 NEOPLASM OF UNSPECIFIED BEHAVIOR OF RESPIRATORY SYSTEM: ICD-10-CM

## 2019-04-12 LAB
CULTURE RESULTS: SIGNIFICANT CHANGE UP
CULTURE RESULTS: SIGNIFICANT CHANGE UP
SPECIMEN SOURCE: SIGNIFICANT CHANGE UP
SPECIMEN SOURCE: SIGNIFICANT CHANGE UP

## 2019-04-16 PROBLEM — Z00.00 ENCOUNTER FOR PREVENTIVE HEALTH EXAMINATION: Noted: 2019-04-16

## 2019-04-17 PROBLEM — R04.89: Chronic | Status: ACTIVE | Noted: 2019-04-07

## 2019-04-17 PROBLEM — C34.90 MALIGNANT NEOPLASM OF UNSPECIFIED PART OF UNSPECIFIED BRONCHUS OR LUNG: Chronic | Status: ACTIVE | Noted: 2019-04-07

## 2019-04-27 LAB
CULTURE RESULTS: SIGNIFICANT CHANGE UP
SPECIMEN SOURCE: SIGNIFICANT CHANGE UP

## 2019-05-18 LAB
CULTURE RESULTS: SIGNIFICANT CHANGE UP
SPECIMEN SOURCE: SIGNIFICANT CHANGE UP

## 2019-09-04 NOTE — ED ADULT NURSE NOTE - NSSUSCREENINGQ3_ED_ALL_ED
No
Alessandro Price)  Orthopaedic Surgery; Surgery of the Hand  [address] [place] [address]  Phone: [phone]  Fax: [phone]  Follow Up Time: 7-10 Days

## 2023-12-11 NOTE — ED PROVIDER NOTE - CARDIAC, MLM
What Type Of Note Output Would You Prefer (Optional)?: Standard Output Is This A New Presentation, Or A Follow-Up?: Rash Normal rate, regular rhythm.  Heart sounds S1, S2.  No murmurs, rubs or gallops.